# Patient Record
Sex: FEMALE | Race: BLACK OR AFRICAN AMERICAN | NOT HISPANIC OR LATINO | Employment: UNEMPLOYED | URBAN - METROPOLITAN AREA
[De-identification: names, ages, dates, MRNs, and addresses within clinical notes are randomized per-mention and may not be internally consistent; named-entity substitution may affect disease eponyms.]

---

## 2021-06-05 ENCOUNTER — HOSPITAL ENCOUNTER (EMERGENCY)
Facility: HOSPITAL | Age: 36
Discharge: HOME | End: 2021-06-05
Attending: EMERGENCY MEDICINE
Payer: MEDICARE

## 2021-06-05 VITALS
DIASTOLIC BLOOD PRESSURE: 88 MMHG | WEIGHT: 293 LBS | SYSTOLIC BLOOD PRESSURE: 149 MMHG | RESPIRATION RATE: 19 BRPM | HEIGHT: 69 IN | BODY MASS INDEX: 43.4 KG/M2 | OXYGEN SATURATION: 98 % | HEART RATE: 81 BPM | TEMPERATURE: 99 F

## 2021-06-05 DIAGNOSIS — R20.0 NUMBNESS: ICD-10-CM

## 2021-06-05 LAB
ALBUMIN SERPL-MCNC: 3.8 G/DL (ref 3.3–5.5)
ALP SERPL-CCNC: 74 U/L (ref 42–141)
B-HCG UR QL: NEGATIVE
BILIRUB SERPL-MCNC: 0.5 MG/DL (ref 0.2–1.6)
BILIRUBIN, POC UA: NEGATIVE
BLOOD, POC UA: ABNORMAL
BUN SERPL-MCNC: 10 MG/DL (ref 7–22)
CALCIUM SERPL-MCNC: 9.5 MG/DL (ref 8–10.3)
CHLORIDE SERPL-SCNC: 104 MMOL/L (ref 98–108)
CLARITY, POC UA: CLEAR
COLOR, POC UA: YELLOW
CREAT SERPL-MCNC: 0.5 MG/DL (ref 0.6–1.2)
CTP QC/QA: YES
GLUCOSE SERPL-MCNC: 113 MG/DL (ref 73–118)
GLUCOSE, POC UA: NEGATIVE
KETONES, POC UA: NEGATIVE
LEUKOCYTE EST, POC UA: NEGATIVE
NITRITE, POC UA: NEGATIVE
PH UR STRIP: 6.5 [PH]
POC ALT (SGPT): 16 U/L (ref 10–47)
POC AST (SGOT): 20 U/L (ref 11–38)
POC TCO2: 27 MMOL/L (ref 18–33)
POTASSIUM BLD-SCNC: 3.4 MMOL/L (ref 3.6–5.1)
PROTEIN, POC UA: ABNORMAL
PROTEIN, POC: 7.7 G/DL (ref 6.4–8.1)
SODIUM BLD-SCNC: 140 MMOL/L (ref 128–145)
SPECIFIC GRAVITY, POC UA: 1.02
UROBILINOGEN, POC UA: 0.2 E.U./DL

## 2021-06-05 PROCEDURE — 81025 URINE PREGNANCY TEST: CPT | Mod: ER | Performed by: EMERGENCY MEDICINE

## 2021-06-05 PROCEDURE — 81003 URINALYSIS AUTO W/O SCOPE: CPT | Mod: ER

## 2021-06-05 PROCEDURE — 93005 ELECTROCARDIOGRAM TRACING: CPT | Mod: ER

## 2021-06-05 PROCEDURE — 93010 EKG 12-LEAD: ICD-10-PCS | Mod: ,,, | Performed by: INTERNAL MEDICINE

## 2021-06-05 PROCEDURE — 93010 ELECTROCARDIOGRAM REPORT: CPT | Mod: ,,, | Performed by: INTERNAL MEDICINE

## 2021-06-05 PROCEDURE — 25000003 PHARM REV CODE 250: Mod: ER | Performed by: NURSE PRACTITIONER

## 2021-06-05 PROCEDURE — 99283 EMERGENCY DEPT VISIT LOW MDM: CPT | Mod: 25,ER

## 2021-06-05 PROCEDURE — 85025 COMPLETE CBC W/AUTO DIFF WBC: CPT | Mod: ER

## 2021-06-05 PROCEDURE — 80053 COMPREHEN METABOLIC PANEL: CPT | Mod: ER

## 2021-06-05 RX ORDER — POTASSIUM CHLORIDE 20 MEQ/1
40 TABLET, EXTENDED RELEASE ORAL
Status: COMPLETED | OUTPATIENT
Start: 2021-06-05 | End: 2021-06-05

## 2021-06-05 RX ADMIN — POTASSIUM CHLORIDE 40 MEQ: 1500 TABLET, EXTENDED RELEASE ORAL at 12:06

## 2022-01-31 ENCOUNTER — HOSPITAL ENCOUNTER (EMERGENCY)
Facility: HOSPITAL | Age: 37
Discharge: HOME | End: 2022-01-31
Attending: EMERGENCY MEDICINE
Payer: MEDICARE

## 2022-01-31 VITALS
BODY MASS INDEX: 43.4 KG/M2 | TEMPERATURE: 98 F | DIASTOLIC BLOOD PRESSURE: 85 MMHG | SYSTOLIC BLOOD PRESSURE: 142 MMHG | OXYGEN SATURATION: 99 % | HEIGHT: 69 IN | RESPIRATION RATE: 18 BRPM | HEART RATE: 92 BPM | WEIGHT: 293 LBS

## 2022-01-31 DIAGNOSIS — R10.9 FLANK PAIN: ICD-10-CM

## 2022-01-31 DIAGNOSIS — N20.0 LEFT RENAL STONE: ICD-10-CM

## 2022-01-31 DIAGNOSIS — O36.80X0 PREGNANCY OF UNKNOWN ANATOMIC LOCATION: Primary | ICD-10-CM

## 2022-01-31 DIAGNOSIS — K80.20 CALCULUS OF GALLBLADDER WITHOUT CHOLECYSTITIS WITHOUT OBSTRUCTION: ICD-10-CM

## 2022-01-31 DIAGNOSIS — Z33.1 PREGNANCY TEST POSITIVE FOR INCIDENTAL PREGNANCY: ICD-10-CM

## 2022-01-31 DIAGNOSIS — O36.80X0 PREGNANCY WITH INCONCLUSIVE FETAL VIABILITY, SINGLE OR UNSPECIFIED FETUS: ICD-10-CM

## 2022-01-31 LAB
ALBUMIN SERPL BCP-MCNC: 3.9 G/DL (ref 3.5–5.2)
ALP SERPL-CCNC: 66 U/L (ref 55–135)
ALT SERPL W/O P-5'-P-CCNC: 16 U/L (ref 10–44)
ANION GAP SERPL CALC-SCNC: 9 MMOL/L (ref 8–16)
AST SERPL-CCNC: 16 U/L (ref 10–40)
B-HCG UR QL: POSITIVE
BACTERIA #/AREA URNS HPF: ABNORMAL /HPF
BASOPHILS # BLD AUTO: 0.03 K/UL (ref 0–0.2)
BASOPHILS NFR BLD: 0.4 % (ref 0–1.9)
BILIRUB SERPL-MCNC: 0.2 MG/DL (ref 0.1–1)
BILIRUB UR QL STRIP: NEGATIVE
BUN SERPL-MCNC: 10 MG/DL (ref 6–20)
CALCIUM SERPL-MCNC: 8.8 MG/DL (ref 8.7–10.5)
CHLORIDE SERPL-SCNC: 105 MMOL/L (ref 95–110)
CLARITY UR: ABNORMAL
CO2 SERPL-SCNC: 22 MMOL/L (ref 23–29)
COLOR UR: ABNORMAL
CREAT SERPL-MCNC: 0.7 MG/DL (ref 0.5–1.4)
CTP QC/QA: YES
DAT IGG-SP REAG RBC-IMP: NORMAL
DIFFERENTIAL METHOD: ABNORMAL
EOSINOPHIL # BLD AUTO: 0.1 K/UL (ref 0–0.5)
EOSINOPHIL NFR BLD: 1.3 % (ref 0–8)
ERYTHROCYTE [DISTWIDTH] IN BLOOD BY AUTOMATED COUNT: 14.6 % (ref 11.5–14.5)
EST. GFR  (AFRICAN AMERICAN): >60 ML/MIN/1.73 M^2
EST. GFR  (NON AFRICAN AMERICAN): >60 ML/MIN/1.73 M^2
GLUCOSE SERPL-MCNC: 104 MG/DL (ref 70–110)
GLUCOSE UR QL STRIP: NEGATIVE
HCG INTACT+B SERPL-ACNC: 413 MIU/ML
HCT VFR BLD AUTO: 35.8 % (ref 37–48.5)
HGB BLD-MCNC: 11.6 G/DL (ref 12–16)
HGB UR QL STRIP: ABNORMAL
HYALINE CASTS #/AREA URNS LPF: 0 /LPF
IMM GRANULOCYTES # BLD AUTO: 0.03 K/UL (ref 0–0.04)
IMM GRANULOCYTES NFR BLD AUTO: 0.4 % (ref 0–0.5)
KETONES UR QL STRIP: NEGATIVE
LEUKOCYTE ESTERASE UR QL STRIP: ABNORMAL
LYMPHOCYTES # BLD AUTO: 2.1 K/UL (ref 1–4.8)
LYMPHOCYTES NFR BLD: 27.5 % (ref 18–48)
MCH RBC QN AUTO: 28.9 PG (ref 27–31)
MCHC RBC AUTO-ENTMCNC: 32.4 G/DL (ref 32–36)
MCV RBC AUTO: 89 FL (ref 82–98)
MICROSCOPIC COMMENT: ABNORMAL
MONOCYTES # BLD AUTO: 0.7 K/UL (ref 0.3–1)
MONOCYTES NFR BLD: 8.4 % (ref 4–15)
NEUTROPHILS # BLD AUTO: 4.8 K/UL (ref 1.8–7.7)
NEUTROPHILS NFR BLD: 62 % (ref 38–73)
NITRITE UR QL STRIP: NEGATIVE
NRBC BLD-RTO: 0 /100 WBC
PH UR STRIP: 7 [PH] (ref 5–8)
PLATELET # BLD AUTO: 309 K/UL (ref 150–450)
PMV BLD AUTO: 8.8 FL (ref 9.2–12.9)
POTASSIUM SERPL-SCNC: 3.8 MMOL/L (ref 3.5–5.1)
PROT SERPL-MCNC: 8.3 G/DL (ref 6–8.4)
PROT UR QL STRIP: ABNORMAL
RBC # BLD AUTO: 4.02 M/UL (ref 4–5.4)
RBC #/AREA URNS HPF: >100 /HPF (ref 0–4)
SODIUM SERPL-SCNC: 136 MMOL/L (ref 136–145)
SP GR UR STRIP: 1.02 (ref 1–1.03)
SQUAMOUS #/AREA URNS HPF: 1 /HPF
UNIDENT CRYS URNS QL MICRO: ABNORMAL
URN SPEC COLLECT METH UR: ABNORMAL
UROBILINOGEN UR STRIP-ACNC: NEGATIVE EU/DL
WBC # BLD AUTO: 7.75 K/UL (ref 3.9–12.7)
WBC #/AREA URNS HPF: 1 /HPF (ref 0–5)

## 2022-01-31 PROCEDURE — 80053 COMPREHEN METABOLIC PANEL: CPT | Performed by: NURSE PRACTITIONER

## 2022-01-31 PROCEDURE — 81000 URINALYSIS NONAUTO W/SCOPE: CPT | Performed by: EMERGENCY MEDICINE

## 2022-01-31 PROCEDURE — 86880 COOMBS TEST DIRECT: CPT | Performed by: EMERGENCY MEDICINE

## 2022-01-31 PROCEDURE — 85025 COMPLETE CBC W/AUTO DIFF WBC: CPT | Performed by: NURSE PRACTITIONER

## 2022-01-31 PROCEDURE — 25000003 PHARM REV CODE 250: Performed by: NURSE PRACTITIONER

## 2022-01-31 PROCEDURE — 81003 URINALYSIS AUTO W/O SCOPE: CPT | Performed by: EMERGENCY MEDICINE

## 2022-01-31 PROCEDURE — 96360 HYDRATION IV INFUSION INIT: CPT

## 2022-01-31 PROCEDURE — 86901 BLOOD TYPING SEROLOGIC RH(D): CPT | Performed by: EMERGENCY MEDICINE

## 2022-01-31 PROCEDURE — 81025 URINE PREGNANCY TEST: CPT | Performed by: EMERGENCY MEDICINE

## 2022-01-31 PROCEDURE — 84702 CHORIONIC GONADOTROPIN TEST: CPT | Performed by: NURSE PRACTITIONER

## 2022-01-31 PROCEDURE — 99284 EMERGENCY DEPT VISIT MOD MDM: CPT | Mod: 25

## 2022-01-31 RX ORDER — KETOROLAC TROMETHAMINE 30 MG/ML
10 INJECTION, SOLUTION INTRAMUSCULAR; INTRAVENOUS
Status: DISCONTINUED | OUTPATIENT
Start: 2022-01-31 | End: 2022-01-31

## 2022-01-31 RX ORDER — ACETAMINOPHEN 325 MG/1
650 TABLET ORAL
Status: COMPLETED | OUTPATIENT
Start: 2022-01-31 | End: 2022-01-31

## 2022-01-31 RX ADMIN — ACETAMINOPHEN 650 MG: 325 TABLET ORAL at 04:01

## 2022-01-31 NOTE — ED PROVIDER NOTES
Encounter Date: 2022    SCRIBE #1 NOTE: I, Erick Tolentino, am scribing for, and in the presence of,  ESPERANZA Delatorre. I have scribed the following portions of the note - Other sections scribed: HPI, ROS.       History     Chief Complaint   Patient presents with    Flank Pain     PT to ER with c/o right sided flank pain since yesterday with hematuria.      36 y.o. female, with a pertinent past medical history of hypertension presents to the ED with bilateral flank pain that began last night. Pt reports associated abdominal pain that began last night. Pt states that the abdominal pain and left sided flank pain resolved. Currently, pt reports only experiencing the right sided flank pain and hematuria. Pt denies having a history of kidney stones. No other exacerbating or alleviating factors. Patient denies rash, dysuria, fever, or other associated symptoms.     Incidental finding of positive UPT after my initial HPI.  Patient's last menstrual cycle was 2021. Prior to this +UPT she is  with a set of twins.  She has no vaginal bleeding or vaginal discharge, no pelvic pain.    The history is provided by the patient. No  was used.     Review of patient's allergies indicates:  No Known Allergies  Past Medical History:   Diagnosis Date    Hypertension      No past surgical history on file.  No family history on file.  Social History     Tobacco Use    Smoking status: Never Smoker     Review of Systems   Constitutional: Negative for chills and fever.   HENT: Negative for congestion, rhinorrhea and sore throat.    Eyes: Negative for visual disturbance.   Respiratory: Negative for cough and shortness of breath.    Cardiovascular: Negative for chest pain.   Gastrointestinal: Positive for abdominal pain. Negative for diarrhea, nausea and vomiting.   Genitourinary: Positive for flank pain and hematuria. Negative for decreased urine volume, dysuria, frequency, pelvic pain, urgency,  vaginal bleeding, vaginal discharge and vaginal pain.   Musculoskeletal: Negative for back pain, neck pain and neck stiffness.   Skin: Negative for rash and wound.   Neurological: Negative for dizziness, weakness and headaches.   Psychiatric/Behavioral: Negative for confusion.       Physical Exam     Initial Vitals [01/31/22 1434]   BP Pulse Resp Temp SpO2   (!) 134/98 95 18 98.4 °F (36.9 °C) 99 %      MAP       --         Physical Exam    Nursing note and vitals reviewed.  Constitutional: She appears well-developed and well-nourished. She is not diaphoretic. She is Obese . She is cooperative.  Non-toxic appearance. She does not have a sickly appearance. She does not appear ill. No distress.   HENT:   Head: Normocephalic and atraumatic.   Right Ear: External ear normal.   Left Ear: External ear normal.   Nose: Nose normal.   Mouth/Throat: Mucous membranes are normal. No trismus in the jaw.   Eyes: Conjunctivae and EOM are normal.   Neck: Phonation normal. No tracheal deviation present.   Normal range of motion.  Cardiovascular: Normal rate, regular rhythm and intact distal pulses.   Pulses:       Radial pulses are 2+ on the right side and 2+ on the left side.   Pulmonary/Chest: Effort normal and breath sounds normal. No accessory muscle usage or stridor. No tachypnea and no bradypnea. No respiratory distress. She has no wheezes. She has no rhonchi. She has no rales. She exhibits no tenderness.   Abdominal: She exhibits no distension. There is no abdominal tenderness.   Reported resolved left flank pain.  Reports some continued (5/10) right flank pain.  No CVA tenderness.  No bruising or rashes.   No right CVA tenderness.  No left CVA tenderness. There is no rebound and no guarding.   Musculoskeletal:         General: Normal range of motion.      Cervical back: Normal range of motion.     Neurological: She is alert and oriented to person, place, and time. She has normal strength. No sensory deficit. Coordination and  gait normal. GCS score is 15. GCS eye subscore is 4. GCS verbal subscore is 5. GCS motor subscore is 6.   Skin: Skin is warm, dry and intact. Capillary refill takes less than 2 seconds. No bruising and no rash noted. No cyanosis or erythema. Nails show no clubbing.   Psychiatric: She has a normal mood and affect. Her behavior is normal. Judgment and thought content normal.         ED Course   Procedures  Labs Reviewed   URINALYSIS, REFLEX TO URINE CULTURE - Abnormal; Notable for the following components:       Result Value    Color, UA Orange (*)     Appearance, UA Hazy (*)     Protein, UA 1+ (*)     Occult Blood UA 3+ (*)     Leukocytes, UA Trace (*)     All other components within normal limits    Narrative:     Specimen Source->Urine   CBC W/ AUTO DIFFERENTIAL - Abnormal; Notable for the following components:    Hemoglobin 11.6 (*)     Hematocrit 35.8 (*)     RDW 14.6 (*)     MPV 8.8 (*)     All other components within normal limits   COMPREHENSIVE METABOLIC PANEL - Abnormal; Notable for the following components:    CO2 22 (*)     All other components within normal limits   URINALYSIS MICROSCOPIC - Abnormal; Notable for the following components:    RBC, UA >100 (*)     All other components within normal limits    Narrative:     Specimen Source->Urine   POCT URINE PREGNANCY - Abnormal; Notable for the following components:    POC Preg Test, Ur Positive (*)     All other components within normal limits   CULTURE, URINE   HCG, QUANTITATIVE    Narrative:     Release to patient->Immediate   GROUP & RH          Imaging Results          US OB <14 Wks TransAbd & TransVag, Single Gestation (XPD) (Final result)  Result time 22 18:50:51    Final result by Joshua Reyes MD (22 18:50:51)                 Impression:      No intrauterine pregnancy or gestational sac visualized, technically pregnancy of unknown location.  Findings may relate to early pregnancy or spontaneous .  No adnexal abnormalities or  significant free fluid seen to suggest ectopic pregnancy.  Recommend serial beta hCGs and repeat pelvic ultrasound as clinically warranted.      Electronically signed by: Joshua Reyes MD  Date:    01/31/2022  Time:    18:50             Narrative:    EXAMINATION:  US OB <14 WEEKS, TRANSABDOM & TRANSVAG, SINGLE GESTATION (XPD)    CLINICAL HISTORY:  +UPT with Flank/Abdominal Pain;    TECHNIQUE:  Transabdominal sonography of the pelvis was performed, followed by transvaginal sonography to better evaluate the uterus and ovaries.    COMPARISON:  None.    FINDINGS:  The uterus measures 10.4 x 6.4 x 6.8 cm. Uterine parenchyma is heterogenous in echotexture with small 2.0 x 1.9 x 1.8 cm fibroid seen.  No intrauterine pregnancy or gestational sac seen.  There is mild heterogenous thickening of the endometrium.    The right ovary measures 3.4 x 1.9 x 1.9 cm. The left ovary measures 2.9 x 3.0 x 2.3 cm. Arterial and venous flow are preserved bilaterally. A suspected corpus luteum cyst measuring 1.4 x 1.4 x 1.3 cm is seen in the left ovary.  No adnexal abnormalities are seen.  No significant free fluid is seen.                               US Abdomen Limited (Final result)  Result time 01/31/22 18:52:14    Final result by Joshua Reyes MD (01/31/22 18:52:14)                 Impression:      Cholelithiasis.  No ultrasonographic evidence to suggest acute cholecystitis.      Electronically signed by: Joshua Reyes MD  Date:    01/31/2022  Time:    18:52             Narrative:    EXAMINATION:  US ABDOMEN LIMITED    CLINICAL HISTORY:  Gallstones;    TECHNIQUE:  Limited ultrasound of the right upper quadrant of the abdomen was performed.    COMPARISON:  None.    FINDINGS:  Hepatic parenchyma is homogeneous without evidence for masses.  No intra- or extrahepatic biliary ductal dilatation. The common bile duct measures 0.4 cm.  The gallbladder demonstrates small mobile stones.  No evidence of gallbladder wall thickening or  pericholecystic fluid.  Sonographic Eldridge's sign is negative. The visualized portion of the pancreas appears normal. No ascites.                               US Retroperitoneal Complete (Final result)  Result time 01/31/22 18:54:28    Final result by Joshua Reyes MD (01/31/22 18:54:28)                 Impression:      No hydronephrosis.  Possible nonobstructing left renal stone.      Electronically signed by: Joshua Reyes MD  Date:    01/31/2022  Time:    18:54             Narrative:    EXAMINATION:  US RETROPERITONEAL COMPLETE    CLINICAL HISTORY:  Flank pain with hemeturia - r/o renal stone;    TECHNIQUE:  Ultrasound of the kidneys and urinary bladder was performed including color flow and Doppler evaluation of the kidneys.    COMPARISON:  None.    FINDINGS:  The kidneys measure 13.7 cm on the right and 12.1 cm on the left. There is preserved corticomedullary differentiation and normal cortical thickness.  No hydronephrosis.  There is a 1.0 cm echogenic focus within the left kidney with shadowing.  No solid renal mass seen.  Perfusion to the kidneys is normal. Resistive index measures 0.65 on the right and is unable to be obtained on the left.  The urinary bladder appears normal.                                 Medications   sodium chloride 0.9% bolus 1,000 mL (0 mLs Intravenous Stopped 1/31/22 1754)   acetaminophen tablet 650 mg (650 mg Oral Given 1/31/22 1654)     Medical Decision Making:   History:   Old Medical Records: I decided to obtain old medical records.       APC / Resident Notes:   This is an evaluation of a 36 y.o. female that presents to the Emergency Department for resolved left flank pain, continued right flank pain, hematuria.  Incidentally found to have a positive pregnancy test in the ED. That LMP would put her at 6 weeks 3 days.  Physical Exam shows a non-toxic, afebrile, and well appearing female.  Her abdomen is soft with no tenderness throughout.  There is no tenderness over the  bilateral CVAs.  There is no muscular tenderness palpation over the left right flank.  There are no rashes, reason, erythema present. Vital signs are reassuring. If available, previous records reviewed. RESULTS:  UPT positive.  CBC without leukocytosis.  H&H 11.6 and 35.8.  Normal platelet count.  No previous labs to compare.  CMP with a CO2 22, otherwise unremarkable.  Urinalysis with trace leukocytes however 1 WBC and occasional bacteria. >100 RBC's. Suspect contamination but will culture as she is pregnant. Incidentally, there are unclassified crystals in the urine.      Ultrasound transabdominal and transvaginal:  No intrauterine pregnancy visualized.  Pregnancy of unknown anatomic location.  Findings may relate to early pregnancy or miscarriage.  No adnexal abnormalities noted to suggest ectopic.  Ultrasound of the abdomen with cholelithiasis without cholecystitis.  Suspected 1 cm nonobstructing left kidney stone. ABO/Rh on previous labs AB+.     My overall impression is flank pain, pregnancies an incidental finding, cholelithiasis without cholecystitis, and nonobstructing left renal stone.  Ultrasound does not identify definite IUP, this could represent early pregnancy versus miscarriage.  Patient's beta hCG is 413. There are no suspicious adnexal masses to suggest ectopic at this time.  I have stressed the importance of following up with OBGYN return to the ED for recheck in 48 hours. I considered, but at this time, do not suspect bowel obstruction, bowel perforation, appendicitis, diverticulitis, cholecystitis, pancreatitis, pyelonephritis.    Discharge Meds/Instructions:  OBGYN follow-up, general surgery follow-up. The diagnosis, treatment plan, instructions for follow-up as well as ED return precautions were discussed. All questions have been answered. This case was discussed with Dr. Bobo who is in agreement with my assessment and plan. ANA LUISA Licea, FNP-C       Scribe Attestation:   Scribe #1:  I performed the above scribed service and the documentation accurately describes the services I performed. I attest to the accuracy of the note.        ED Course as of 01/31/22 2050 Mon Jan 31, 2022   1631 Incidental finding of positive pregnancy test was discussed with the patient.  Will cancel the CT abdomen pelvis and add ultrasound with ABO Rh on HCG level. [AF]   1800 Ultrasound tech called stated she noted gallstones in the gallbladder.  Requested ultrasound of the abdomen limited.  Order has been placed. [AF]      ED Course User Index  [AF] ESPERANZA Delatorre             Clinical Impression:   Final diagnoses:  [R10.9] Flank pain  [Z33.1] Pregnancy test positive for incidental pregnancy  [N20.0] Left renal stone  [K80.20] Calculus of gallbladder without cholecystitis without obstruction  [O36.80X0] Pregnancy of unknown anatomic location (Primary)  [O36.80X0] Pregnancy with inconclusive fetal viability, single or unspecified fetus          ED Disposition Condition    Discharge Stable        ED Prescriptions     None        Follow-up Information     Follow up With Specialties Details Why Contact Info    Your OB/GYN  Schedule an appointment as soon as possible for a visit  Call to Schedule an Appointment For Follow-Up     Dario Burkett MD General Surgery, Surgery Call in 1 day To discuss your ED visit & schedule follow-up (Gallbladder) [address]  [address]  [facility] [address]  [phone]      Larisa Quinonez MD Urology Call  To discuss your ED visit & schedule follow-up (Kidney Stone) [address]  [address]  [place] [address]  [phone]      [facility] Emergency [facility] Emergency Medicine Go to  If symptoms worsen [address]  [facility] [id number] [phone]       IBESSIE, ANA LUISA, FNP-C, personally performed the services described in this documentation. All medical record entries made by the scribe were at my direction and in my presence. I have reviewed the chart  and agree that the record reflects my personal performance and is accurate and complete.       Marko Zelaya, [facility]  01/31/22 2051

## 2022-01-31 NOTE — DISCHARGE INSTRUCTIONS
Please call your OBGYN in the morning to discuss your positive pregnancy test.  Your pregnancy hormone (hCG) was 413 today. You will need a recheck in 48 hours. If not able to contact your OB please return to the ER.     § Please return to the Emergency Department for any new or worsening symptoms including: fever, chest pain, shortness of breath, loss of consciousness, dizziness, weakness, vaginal bleeding, abdominal pain or cramping, or any other concerns.     § Schedule an appointment for follow up with OB/GYN for pregnancy, Urology for left kidney stone, and General Surgery for gallstones, as soon as possible for a recheck of your symptoms. If you do not have one, contact the one listed on your discharge paperwork or call the [facility] Appointment Desk at [phone] to schedule an appointment.     § Tylenol as needed for pain.  Please start taking a prenatal vitamin.

## 2022-02-01 ENCOUNTER — PES CALL (OUTPATIENT)
Dept: ADMINISTRATIVE | Facility: CLINIC | Age: 37
End: 2022-02-01
Payer: MEDICARE

## 2022-02-01 LAB
ABO GROUP BLD: NORMAL
RH BLD: NORMAL

## 2022-02-18 ENCOUNTER — HOSPITAL ENCOUNTER (EMERGENCY)
Facility: HOSPITAL | Age: 37
Discharge: HOME | End: 2022-02-18
Attending: EMERGENCY MEDICINE
Payer: MEDICARE

## 2022-02-18 VITALS
SYSTOLIC BLOOD PRESSURE: 111 MMHG | HEIGHT: 69 IN | RESPIRATION RATE: 18 BRPM | HEART RATE: 102 BPM | OXYGEN SATURATION: 100 % | BODY MASS INDEX: 43.4 KG/M2 | TEMPERATURE: 99 F | DIASTOLIC BLOOD PRESSURE: 72 MMHG | WEIGHT: 293 LBS

## 2022-02-18 DIAGNOSIS — Z11.3 SCREEN FOR STD (SEXUALLY TRANSMITTED DISEASE): Primary | ICD-10-CM

## 2022-02-18 DIAGNOSIS — A53.9 SYPHILIS: ICD-10-CM

## 2022-02-18 LAB
B-HCG UR QL: POSITIVE
CTP QC/QA: YES

## 2022-02-18 PROCEDURE — 86593 SYPHILIS TEST NON-TREP QUANT: CPT | Mod: 91 | Performed by: NURSE PRACTITIONER

## 2022-02-18 PROCEDURE — 86780 TREPONEMA PALLIDUM: CPT | Performed by: NURSE PRACTITIONER

## 2022-02-18 PROCEDURE — 36415 COLL VENOUS BLD VENIPUNCTURE: CPT | Performed by: NURSE PRACTITIONER

## 2022-02-18 PROCEDURE — 96372 THER/PROPH/DIAG INJ SC/IM: CPT | Mod: ER

## 2022-02-18 PROCEDURE — 86593 SYPHILIS TEST NON-TREP QUANT: CPT | Performed by: NURSE PRACTITIONER

## 2022-02-18 PROCEDURE — 99284 EMERGENCY DEPT VISIT MOD MDM: CPT | Mod: 25,ER

## 2022-02-18 PROCEDURE — 86592 SYPHILIS TEST NON-TREP QUAL: CPT | Performed by: NURSE PRACTITIONER

## 2022-02-18 PROCEDURE — 81025 URINE PREGNANCY TEST: CPT | Mod: ER | Performed by: NURSE PRACTITIONER

## 2022-02-18 PROCEDURE — 86780 TREPONEMA PALLIDUM: CPT | Mod: 91 | Performed by: NURSE PRACTITIONER

## 2022-02-18 PROCEDURE — 63600175 PHARM REV CODE 636 W HCPCS: Mod: JG,ER | Performed by: NURSE PRACTITIONER

## 2022-02-18 RX ORDER — LABETALOL 200 MG/1
200 TABLET, FILM COATED ORAL 2 TIMES DAILY
COMMUNITY
End: 2023-12-19

## 2022-02-18 RX ADMIN — PENICILLIN G BENZATHINE 2.4 MILLION UNITS: 1200000 INJECTION, SUSPENSION INTRAMUSCULAR at 06:02

## 2022-02-19 NOTE — ED PROVIDER NOTES
Encounter Date: 2/18/2022    SCRIBE #1 NOTE: I, Khai Reyes, am scribing for, and in the presence of,  Esther Waters NP. I have scribed the following portions of the note - Other sections scribed: HPI, ROS.       History     Chief Complaint   Patient presents with    STD CHECK     Pt reports she is 6 weeks pregnant and her obgyn called to let her know that her blood work showed that she was positive for syphilis. Pt reports the dr did send a prescription to [facility] but she has not picked it up. Denies symptoms at this time, denies pain or discharge at this time.      Patient is a 36 year old female with PMHx of HTN who presents to ED with complaints of a STD check onset today. She reports that her Ob called her to report that she was positive for syphilis. Not currently displaying any symptoms. Patient is currently 6 weeks pregnant. She notes her boyfriend was treated for syphilis here recently. No recent antibiotic use. She has another appointment with PCP Monday to monitor her pressure. Denies any rash. No other complaints at this time.     The history is provided by the patient and a significant other. No  was used.     Review of patient's allergies indicates:  No Known Allergies  Past Medical History:   Diagnosis Date    Hypertension      No past surgical history on file.  No family history on file.  Social History     Tobacco Use    Smoking status: Never Smoker     Review of Systems   Constitutional: Negative for activity change, appetite change, chills, fatigue and fever.   HENT: Negative for congestion, sore throat, trouble swallowing and voice change.    Eyes: Negative for photophobia, pain, redness and visual disturbance.   Respiratory: Negative for cough, chest tightness, shortness of breath and wheezing.    Cardiovascular: Negative for chest pain, palpitations and leg swelling.   Gastrointestinal: Negative for abdominal distention, abdominal pain, anal bleeding, constipation,  diarrhea, nausea and vomiting.   Endocrine: Negative for polydipsia, polyphagia and polyuria.   Genitourinary: Negative for difficulty urinating, dysuria, frequency, hematuria, urgency, vaginal bleeding and vaginal discharge.   Musculoskeletal: Negative for arthralgias and myalgias.   Skin: Negative for rash and wound.   Neurological: Negative for dizziness, weakness, light-headedness and headaches.   Hematological: Negative for adenopathy.   All other systems reviewed and are negative.      Physical Exam     Initial Vitals [02/18/22 1739]   BP Pulse Resp Temp SpO2   114/70 110 16 98.6 °F (37 °C) 100 %      MAP       --         Physical Exam    Constitutional: She appears well-developed and well-nourished. She is not diaphoretic. No distress.   HENT:   Head: Normocephalic and atraumatic.   Neck:   Normal range of motion.  Cardiovascular: Normal rate, regular rhythm, normal heart sounds and intact distal pulses.   Pulmonary/Chest: Breath sounds normal. No respiratory distress.   Abdominal: Abdomen is soft. Bowel sounds are normal. There is no abdominal tenderness.   Musculoskeletal:         General: Normal range of motion.      Cervical back: Normal range of motion.     Neurological: She is alert and oriented to person, place, and time.   Skin: Skin is warm and dry.   Psychiatric: She has a normal mood and affect. Her behavior is normal.         ED Course   Procedures  Labs Reviewed   POCT URINE PREGNANCY - Abnormal; Notable for the following components:       Result Value    POC Preg Test, Ur Positive (*)     All other components within normal limits   RPR   TREPONEMA PALLIDUM (SYPHILIS) ANTIBODY          Imaging Results    None          Medications   penicillin G benzathine (BICILLIN LA) injection 2.4 Million Units (2.4 Million Units Intramuscular Given 2/18/22 1828)     Medical Decision Making:   ED Management:  36 year old pregnant female presenting to ED for treatment after her OBGYN called her and told her she  tested positive for syphilis.  She has no symptoms.  Denies any rash or skin lesions.  She is well-appearing.  Vital signs are stable.  Treated with Bicillin.  She has an appointment Monday with her OBGYN.  She will keep this for follow-up.          Scribe Attestation:   Scribe #1: I performed the above scribed service and the documentation accurately describes the services I performed. I attest to the accuracy of the note.                   I, AMADO Waters, personally performed the services described in this documentation. All medical record entries made by the scribe were at my direction and in my presence. I have reviewed the chart and agree that the record reflects my personal performance and is accurate and complete.  Clinical Impression:   Final diagnoses:  [Z11.3] Screen for STD (sexually transmitted disease) (Primary)  [A53.9] Syphilis          ED Disposition Condition    Discharge Stable        ED Prescriptions     None        Follow-up Information     Follow up With Specialties Details Why Contact Info    Haley Henson MD Obstetrics and Gynecology Schedule an appointment as soon as possible for a visit on 2/21/2022 For follow-up [address] [facility] [id number] [phone]      [facility] ED Emergency Medicine Go to  If symptoms worsen [address] [facility] [address] [phone]           Esther Waters NP  02/18/22 1931

## 2022-02-19 NOTE — DISCHARGE INSTRUCTIONS
Please keep your appointment Monday with your OBGYN for further evaluation and treatment.  Return to the emergency department for any new or worsening symptoms.  Use a condom.

## 2022-02-21 LAB
RPR SER QL: REACTIVE
RPR SER-TITR: ABNORMAL {TITER}

## 2022-02-22 LAB
T PALLIDUM AB SER QL IF: REACTIVE
TREPONEMA PALLIDUM IGG+IGM AB [PRESENCE] IN SERUM OR PLASMA BY IMMUNOASSAY: REACTIVE

## 2022-02-23 LAB — RPR SER-TITR: ABNORMAL {TITER}

## 2022-06-26 ENCOUNTER — HOSPITAL ENCOUNTER (EMERGENCY)
Facility: HOSPITAL | Age: 37
Discharge: LEFT BEFORE DISCHARGE | End: 2022-06-26
Attending: EMERGENCY MEDICINE
Payer: MEDICARE

## 2022-06-26 VITALS
WEIGHT: 293 LBS | TEMPERATURE: 98 F | SYSTOLIC BLOOD PRESSURE: 145 MMHG | BODY MASS INDEX: 44.41 KG/M2 | OXYGEN SATURATION: 98 % | RESPIRATION RATE: 18 BRPM | DIASTOLIC BLOOD PRESSURE: 91 MMHG | HEIGHT: 68 IN | HEART RATE: 102 BPM

## 2022-06-26 LAB
BILIRUBIN, POC UA: ABNORMAL
BLOOD, POC UA: ABNORMAL
CLARITY, POC UA: ABNORMAL
COLOR, POC UA: ABNORMAL
GLUCOSE, POC UA: NEGATIVE
KETONES, POC UA: ABNORMAL
LEUKOCYTE EST, POC UA: NEGATIVE
NITRITE, POC UA: NEGATIVE
PH UR STRIP: 6.5 [PH]
PROTEIN, POC UA: ABNORMAL
SPECIFIC GRAVITY, POC UA: 1.02
UROBILINOGEN, POC UA: 0.2 E.U./DL

## 2022-06-26 PROCEDURE — 81003 URINALYSIS AUTO W/O SCOPE: CPT | Mod: ER

## 2022-06-26 PROCEDURE — 99283 EMERGENCY DEPT VISIT LOW MDM: CPT | Mod: ER

## 2022-10-25 ENCOUNTER — HOSPITAL ENCOUNTER (INPATIENT)
Facility: HOSPITAL | Age: 37
LOS: 3 days | Discharge: HOME | DRG: 872 | End: 2022-10-28
Attending: EMERGENCY MEDICINE | Admitting: STUDENT IN AN ORGANIZED HEALTH CARE EDUCATION/TRAINING PROGRAM
Payer: MEDICARE

## 2022-10-25 DIAGNOSIS — N12 PYELONEPHRITIS: Primary | ICD-10-CM

## 2022-10-25 DIAGNOSIS — R00.0 TACHYCARDIA: ICD-10-CM

## 2022-10-25 DIAGNOSIS — R07.9 CHEST PAIN: ICD-10-CM

## 2022-10-25 PROBLEM — I10 PRIMARY HYPERTENSION: Status: ACTIVE | Noted: 2022-10-25

## 2022-10-25 PROBLEM — N20.0 RENAL CALCULUS: Status: ACTIVE | Noted: 2022-10-25

## 2022-10-25 PROBLEM — A41.9 SEPSIS: Status: ACTIVE | Noted: 2022-10-25

## 2022-10-25 LAB
ALBUMIN SERPL-MCNC: 3.6 G/DL (ref 3.3–5.5)
ALLENS TEST: ABNORMAL
ALLENS TEST: ABNORMAL
ALP SERPL-CCNC: 90 U/L (ref 42–141)
B-HCG UR QL: NEGATIVE
BILIRUB SERPL-MCNC: 1.1 MG/DL (ref 0.2–1.6)
BILIRUBIN, POC UA: ABNORMAL
BLOOD, POC UA: ABNORMAL
BUN SERPL-MCNC: 10 MG/DL (ref 7–22)
CALCIUM SERPL-MCNC: 10.1 MG/DL (ref 8–10.3)
CHLORIDE SERPL-SCNC: 100 MMOL/L (ref 98–108)
CLARITY, POC UA: CLEAR
COLOR, POC UA: YELLOW
CREAT SERPL-MCNC: 0.8 MG/DL (ref 0.6–1.2)
CTP QC/QA: YES
CTP QC/QA: YES
GLUCOSE SERPL-MCNC: 131 MG/DL (ref 73–118)
GLUCOSE, POC UA: NEGATIVE
HCO3 UR-SCNC: 23.3 MMOL/L (ref 24–28)
HCO3 UR-SCNC: 25.2 MMOL/L (ref 24–28)
INFLUENZA A ANTIGEN, POC: NEGATIVE
INFLUENZA B ANTIGEN, POC: NEGATIVE
KETONES, POC UA: ABNORMAL
LDH SERPL L TO P-CCNC: 0.74 MMOL/L (ref 0.5–2.2)
LDH SERPL L TO P-CCNC: 2.34 MMOL/L (ref 0.5–2.2)
LEUKOCYTE EST, POC UA: ABNORMAL
NITRITE, POC UA: POSITIVE
PCO2 BLDA: 35.9 MMHG (ref 35–45)
PCO2 BLDA: 45.2 MMHG (ref 35–45)
PH SMN: 7.35 [PH] (ref 7.35–7.45)
PH SMN: 7.42 [PH] (ref 7.35–7.45)
PH UR STRIP: 7 [PH]
PO2 BLDA: 163 MMHG (ref 40–60)
PO2 BLDA: 50 MMHG (ref 40–60)
POC ALT (SGPT): 21 U/L (ref 10–47)
POC AST (SGOT): 19 U/L (ref 11–38)
POC BE: -1 MMOL/L
POC BE: -1 MMOL/L
POC RAPID STREP A: NEGATIVE
POC SATURATED O2: 86 % (ref 95–100)
POC SATURATED O2: 99 % (ref 95–100)
POC TCO2: 24 MMOL/L (ref 18–33)
POC TCO2: 24 MMOL/L (ref 24–29)
POC TCO2: 27 MMOL/L (ref 24–29)
POTASSIUM BLD-SCNC: 3.4 MMOL/L (ref 3.6–5.1)
PROTEIN, POC UA: ABNORMAL
PROTEIN, POC: 8.4 G/DL (ref 6.4–8.1)
SAMPLE: ABNORMAL
SAMPLE: ABNORMAL
SARS-COV-2 RDRP RESP QL NAA+PROBE: NEGATIVE
SITE: ABNORMAL
SITE: ABNORMAL
SODIUM BLD-SCNC: 138 MMOL/L (ref 128–145)
SPECIFIC GRAVITY, POC UA: 1.02
UROBILINOGEN, POC UA: 4 E.U./DL

## 2022-10-25 PROCEDURE — 82803 BLOOD GASES ANY COMBINATION: CPT | Mod: ER

## 2022-10-25 PROCEDURE — 87088 URINE BACTERIA CULTURE: CPT | Performed by: NURSE PRACTITIONER

## 2022-10-25 PROCEDURE — 11000001 HC ACUTE MED/SURG PRIVATE ROOM

## 2022-10-25 PROCEDURE — 25000003 PHARM REV CODE 250: Mod: ER | Performed by: EMERGENCY MEDICINE

## 2022-10-25 PROCEDURE — 96375 TX/PRO/DX INJ NEW DRUG ADDON: CPT | Mod: ER

## 2022-10-25 PROCEDURE — 93010 EKG 12-LEAD: ICD-10-PCS | Mod: ,,, | Performed by: INTERNAL MEDICINE

## 2022-10-25 PROCEDURE — 85025 COMPLETE CBC W/AUTO DIFF WBC: CPT | Mod: ER

## 2022-10-25 PROCEDURE — 81025 URINE PREGNANCY TEST: CPT | Mod: ER | Performed by: EMERGENCY MEDICINE

## 2022-10-25 PROCEDURE — 63600175 PHARM REV CODE 636 W HCPCS: Mod: ER | Performed by: EMERGENCY MEDICINE

## 2022-10-25 PROCEDURE — 87635 SARS-COV-2 COVID-19 AMP PRB: CPT | Mod: ER | Performed by: NURSE PRACTITIONER

## 2022-10-25 PROCEDURE — 87077 CULTURE AEROBIC IDENTIFY: CPT | Mod: 59 | Performed by: NURSE PRACTITIONER

## 2022-10-25 PROCEDURE — 80053 COMPREHEN METABOLIC PANEL: CPT | Mod: ER

## 2022-10-25 PROCEDURE — 25000003 PHARM REV CODE 250: Mod: ER | Performed by: NURSE PRACTITIONER

## 2022-10-25 PROCEDURE — 93010 ELECTROCARDIOGRAM REPORT: CPT | Mod: ,,, | Performed by: INTERNAL MEDICINE

## 2022-10-25 PROCEDURE — 99285 EMERGENCY DEPT VISIT HI MDM: CPT | Mod: 25,ER

## 2022-10-25 PROCEDURE — 25000003 PHARM REV CODE 250: Performed by: STUDENT IN AN ORGANIZED HEALTH CARE EDUCATION/TRAINING PROGRAM

## 2022-10-25 PROCEDURE — 81003 URINALYSIS AUTO W/O SCOPE: CPT | Mod: ER

## 2022-10-25 PROCEDURE — 87086 URINE CULTURE/COLONY COUNT: CPT | Performed by: NURSE PRACTITIONER

## 2022-10-25 PROCEDURE — 87040 BLOOD CULTURE FOR BACTERIA: CPT | Performed by: NURSE PRACTITIONER

## 2022-10-25 PROCEDURE — 87186 SC STD MICRODIL/AGAR DIL: CPT | Performed by: NURSE PRACTITIONER

## 2022-10-25 PROCEDURE — 96365 THER/PROPH/DIAG IV INF INIT: CPT | Mod: ER

## 2022-10-25 PROCEDURE — 93005 ELECTROCARDIOGRAM TRACING: CPT | Mod: ER

## 2022-10-25 PROCEDURE — 87804 INFLUENZA ASSAY W/OPTIC: CPT | Mod: 59,ER

## 2022-10-25 PROCEDURE — 25500020 PHARM REV CODE 255: Mod: ER | Performed by: EMERGENCY MEDICINE

## 2022-10-25 RX ORDER — KETOROLAC TROMETHAMINE 30 MG/ML
15 INJECTION, SOLUTION INTRAMUSCULAR; INTRAVENOUS
Status: COMPLETED | OUTPATIENT
Start: 2022-10-25 | End: 2022-10-25

## 2022-10-25 RX ORDER — SODIUM,POTASSIUM PHOSPHATES 280-250MG
2 POWDER IN PACKET (EA) ORAL
Status: DISCONTINUED | OUTPATIENT
Start: 2022-10-25 | End: 2022-10-28 | Stop reason: HOSPADM

## 2022-10-25 RX ORDER — ACETAMINOPHEN 325 MG/1
650 TABLET ORAL EVERY 8 HOURS PRN
Status: DISCONTINUED | OUTPATIENT
Start: 2022-10-25 | End: 2022-10-26

## 2022-10-25 RX ORDER — LANOLIN ALCOHOL/MO/W.PET/CERES
800 CREAM (GRAM) TOPICAL
Status: DISCONTINUED | OUTPATIENT
Start: 2022-10-25 | End: 2022-10-26

## 2022-10-25 RX ORDER — ONDANSETRON 2 MG/ML
4 INJECTION INTRAMUSCULAR; INTRAVENOUS EVERY 8 HOURS PRN
Status: DISCONTINUED | OUTPATIENT
Start: 2022-10-25 | End: 2022-10-28 | Stop reason: HOSPADM

## 2022-10-25 RX ORDER — ACETAMINOPHEN 500 MG
1000 TABLET ORAL
Status: COMPLETED | OUTPATIENT
Start: 2022-10-25 | End: 2022-10-25

## 2022-10-25 RX ORDER — LABETALOL 100 MG/1
200 TABLET, FILM COATED ORAL 2 TIMES DAILY
Status: DISCONTINUED | OUTPATIENT
Start: 2022-10-25 | End: 2022-10-28 | Stop reason: HOSPADM

## 2022-10-25 RX ORDER — GLUCAGON 1 MG
1 KIT INJECTION
Status: DISCONTINUED | OUTPATIENT
Start: 2022-10-25 | End: 2022-10-28 | Stop reason: HOSPADM

## 2022-10-25 RX ORDER — SIMETHICONE 80 MG
1 TABLET,CHEWABLE ORAL 4 TIMES DAILY PRN
Status: DISCONTINUED | OUTPATIENT
Start: 2022-10-25 | End: 2022-10-28 | Stop reason: HOSPADM

## 2022-10-25 RX ORDER — IBUPROFEN 200 MG
16 TABLET ORAL
Status: DISCONTINUED | OUTPATIENT
Start: 2022-10-25 | End: 2022-10-28 | Stop reason: HOSPADM

## 2022-10-25 RX ORDER — BISACODYL 10 MG
10 SUPPOSITORY, RECTAL RECTAL DAILY PRN
Status: DISCONTINUED | OUTPATIENT
Start: 2022-10-25 | End: 2022-10-28 | Stop reason: HOSPADM

## 2022-10-25 RX ORDER — SODIUM CHLORIDE 0.9 % (FLUSH) 0.9 %
10 SYRINGE (ML) INJECTION EVERY 12 HOURS PRN
Status: DISCONTINUED | OUTPATIENT
Start: 2022-10-25 | End: 2022-10-28 | Stop reason: HOSPADM

## 2022-10-25 RX ORDER — IBUPROFEN 200 MG
24 TABLET ORAL
Status: DISCONTINUED | OUTPATIENT
Start: 2022-10-25 | End: 2022-10-28 | Stop reason: HOSPADM

## 2022-10-25 RX ORDER — NALOXONE HCL 0.4 MG/ML
0.02 VIAL (ML) INJECTION
Status: DISCONTINUED | OUTPATIENT
Start: 2022-10-25 | End: 2022-10-28 | Stop reason: HOSPADM

## 2022-10-25 RX ORDER — MAG HYDROX/ALUMINUM HYD/SIMETH 200-200-20
30 SUSPENSION, ORAL (FINAL DOSE FORM) ORAL 4 TIMES DAILY PRN
Status: DISCONTINUED | OUTPATIENT
Start: 2022-10-25 | End: 2022-10-28 | Stop reason: HOSPADM

## 2022-10-25 RX ORDER — IPRATROPIUM BROMIDE AND ALBUTEROL SULFATE 2.5; .5 MG/3ML; MG/3ML
3 SOLUTION RESPIRATORY (INHALATION) EVERY 4 HOURS PRN
Status: DISCONTINUED | OUTPATIENT
Start: 2022-10-25 | End: 2022-10-28 | Stop reason: HOSPADM

## 2022-10-25 RX ORDER — POLYETHYLENE GLYCOL 3350 17 G/17G
17 POWDER, FOR SOLUTION ORAL DAILY
Status: DISCONTINUED | OUTPATIENT
Start: 2022-10-26 | End: 2022-10-28 | Stop reason: HOSPADM

## 2022-10-25 RX ORDER — HYDROCODONE BITARTRATE AND ACETAMINOPHEN 5; 325 MG/1; MG/1
1 TABLET ORAL EVERY 6 HOURS PRN
Status: DISCONTINUED | OUTPATIENT
Start: 2022-10-25 | End: 2022-10-28 | Stop reason: HOSPADM

## 2022-10-25 RX ORDER — PROCHLORPERAZINE EDISYLATE 5 MG/ML
5 INJECTION INTRAMUSCULAR; INTRAVENOUS EVERY 6 HOURS PRN
Status: DISCONTINUED | OUTPATIENT
Start: 2022-10-25 | End: 2022-10-28 | Stop reason: HOSPADM

## 2022-10-25 RX ORDER — HEPARIN SODIUM 5000 [USP'U]/ML
5000 INJECTION, SOLUTION INTRAVENOUS; SUBCUTANEOUS EVERY 8 HOURS
Status: DISCONTINUED | OUTPATIENT
Start: 2022-10-26 | End: 2022-10-28 | Stop reason: HOSPADM

## 2022-10-25 RX ORDER — ACETAMINOPHEN 325 MG/1
650 TABLET ORAL EVERY 4 HOURS PRN
Status: DISCONTINUED | OUTPATIENT
Start: 2022-10-25 | End: 2022-10-28 | Stop reason: HOSPADM

## 2022-10-25 RX ORDER — TALC
6 POWDER (GRAM) TOPICAL NIGHTLY PRN
Status: DISCONTINUED | OUTPATIENT
Start: 2022-10-25 | End: 2022-10-28 | Stop reason: HOSPADM

## 2022-10-25 RX ADMIN — SODIUM CHLORIDE 1000 ML: 0.9 INJECTION, SOLUTION INTRAVENOUS at 04:10

## 2022-10-25 RX ADMIN — KETOROLAC TROMETHAMINE 15 MG: 30 INJECTION, SOLUTION INTRAMUSCULAR at 05:10

## 2022-10-25 RX ADMIN — LABETALOL HYDROCHLORIDE 200 MG: 100 TABLET, FILM COATED ORAL at 09:10

## 2022-10-25 RX ADMIN — ACETAMINOPHEN 1000 MG: 500 TABLET ORAL at 04:10

## 2022-10-25 RX ADMIN — IOHEXOL 100 ML: 350 INJECTION, SOLUTION INTRAVENOUS at 05:10

## 2022-10-25 RX ADMIN — CEFTRIAXONE 2 G: 2 INJECTION, SOLUTION INTRAVENOUS at 04:10

## 2022-10-25 RX ADMIN — ACETAMINOPHEN 650 MG: 325 TABLET ORAL at 11:10

## 2022-10-25 NOTE — Clinical Note
Diagnosis: Pyelonephritis [976032]   Admitting Provider:: NERY DUONG [[id number]]   Future Attending Provider: NERY DUONG [[id number]]   Reason for IP Medical Treatment  (Clinical interventions that can only be accomplished in the IP setting? ) :: Abx, IVFs, supportive care   Estimated Length of Stay:: 2 midnights   I certify that Inpatient services for greater than or equal to 2 midnights are medically necessary:: Yes   Plans for Post-Acute care--if anticipated (pick the single best option):: A. No post acute care anticipated at this time

## 2022-10-25 NOTE — ED PROVIDER NOTES
"Encounter Date: 10/25/2022    SCRIBE #1 NOTE: I, Allyssa Joe, am scribing for, and in the presence of,  Bradley Beltran MD. I have scribed the following portions of the note - Other sections scribed: HPI; ROS.     History     Chief Complaint   Patient presents with    Abdominal Pain    Fever     Pt states," I have pains in my stomach for three days and I get hot and cold."     Kriss Alvarez is a 36 y.o. female with Hx of HTN who presents to the ED for chief complaint of worsening epigastric abdominal pain onset 1 day ago. Patient has associated nausea and diarrhea. Patient reports she does not have any abdominal SHx. She states her children have URI symptoms. Patient did not attempt treatment at home. She denies vomiting, sore throat, congestion, hematuria, urgency, dysuria, or shortness of breath. No further complaints at this time.       The history is provided by the patient. No  was used.   Review of patient's allergies indicates:  No Known Allergies  Past Medical History:   Diagnosis Date    Hypertension      No past surgical history on file.  No family history on file.  Social History     Tobacco Use    Smoking status: Never     Review of Systems   Constitutional: Negative.    HENT: Negative.  Negative for congestion.    Eyes: Negative.    Respiratory: Negative.  Negative for shortness of breath.    Cardiovascular: Negative.    Gastrointestinal:  Positive for abdominal pain, diarrhea and nausea. Negative for vomiting.   Endocrine: Negative.    Genitourinary: Negative.  Negative for dysuria, hematuria and urgency.   Musculoskeletal: Negative.    Skin: Negative.    Allergic/Immunologic: Negative.    Neurological: Negative.    Hematological: Negative.    Psychiatric/Behavioral: Negative.       Physical Exam     Initial Vitals [10/25/22 1531]   BP Pulse Resp Temp SpO2   (!) 168/110 (!) 124 18 (!) 103 °F (39.4 °C) 99 %      MAP       --         Physical Exam    Nursing note and vitals " reviewed.  Constitutional: She appears well-developed. She is not diaphoretic. She appears distressed (mildly).   HENT:   Head: Normocephalic and atraumatic.   Nose: Nose normal.   Eyes: EOM are normal. Pupils are equal, round, and reactive to light. Right eye exhibits no discharge. Left eye exhibits no discharge.   Neck: Neck supple. No tracheal deviation present. No JVD present.   Normal range of motion.  Cardiovascular:  Regular rhythm, normal heart sounds and intact distal pulses.           tachycardic   Pulmonary/Chest: No stridor. She is in respiratory distress (mild tachypnea). She has no wheezes. She has no rhonchi. She has no rales.   Abdominal: Abdomen is soft. Bowel sounds are normal. She exhibits no distension. There is abdominal tenderness (epigastric ttp). There is no rebound and no guarding.   Musculoskeletal:         General: No tenderness or edema. Normal range of motion.      Cervical back: Normal range of motion and neck supple.     Neurological: She is alert and oriented to person, place, and time. She has normal strength.   Skin: Skin is warm and dry. Capillary refill takes less than 2 seconds. No rash noted. No erythema.       ED Course   Critical Care    Date/Time: 10/25/2022 3:50 PM  Performed by: Bradley Beltran MD  Authorized by: Bradley Beltran MD   Direct patient critical care time: 15 minutes  Additional history critical care time: 5 minutes  Ordering / reviewing critical care time: 5 minutes  Documentation critical care time: 5 minutes  Consulting other physicians critical care time: 5 minutes  Total critical care time (exclusive of procedural time) : 35 minutes  Critical care time was exclusive of separately billable procedures and treating other patients and teaching time.  Critical care was necessary to treat or prevent imminent or life-threatening deterioration of the following conditions: circulatory failure, shock and sepsis.  Critical care was time spent personally by  me on the following activities: development of treatment plan with patient or surrogate, discussions with consultants, evaluation of patient's response to treatment, examination of patient, obtaining history from patient or surrogate, ordering and performing treatments and interventions, ordering and review of laboratory studies, ordering and review of radiographic studies, re-evaluation of patient's condition and review of old charts.      Labs Reviewed   POCT URINALYSIS W/O SCOPE - Abnormal; Notable for the following components:       Result Value    Bilirubin, UA 1+ (*)     Ketones, UA Trace (*)     Blood, UA 3+ (*)     Protein, UA 3+ (*)     Urobilinogen, UA 4.0 (*)     Nitrite, UA Positive (*)     Leukocytes, UA 1+ (*)     All other components within normal limits   POCT CMP - Abnormal; Notable for the following components:    POC Glucose 131 (*)     POC Potassium 3.4 (*)     Protein, POC 8.4 (*)     All other components within normal limits   ISTAT PROCEDURE - Abnormal; Notable for the following components:    POC PCO2 45.2 (*)     POC PO2 163 (*)     POC Lactate 2.34 (*)     All other components within normal limits   ISTAT PROCEDURE - Abnormal; Notable for the following components:    POC HCO3 23.3 (*)     POC SATURATED O2 86 (*)     All other components within normal limits   CULTURE, URINE   POCT URINE PREGNANCY   POCT URINALYSIS W/O SCOPE   POCT CBC   POCT URINALYSIS W/O SCOPE   SARS-COV-2 RDRP GENE    Narrative:     This test utilizes isothermal nucleic acid amplification   technology to detect the SARS-CoV-2 RdRp nucleic acid segment.   The analytical sensitivity (limit of detection) is 125 genome   equivalents/mL.   A POSITIVE result implies infection with the SARS-CoV-2 virus;   the patient is presumed to be contagious.     A NEGATIVE result means that SARS-CoV-2 nucleic acids are not   present above the limit of detection. A NEGATIVE result should be   treated as presumptive. It does not rule out the  possibility of   COVID-19 and should not be the sole basis for treatment decisions.   If COVID-19 is strongly suspected based on clinical and exposure   history, re-testing using an alternate molecular assay should be   considered.   This test is only for use under the Food and Drug   Administration s Emergency Use Authorization (EUA).   Commercial kits are provided by [employer].   Performance characteristics of the EUA have been independently   verified by [facility] Department of   Pathology and Laboratory Medicine.   _________________________________________________________________   The authorized Fact Sheet for Healthcare Providers and the authorized Fact   Sheet for Patients of the ID NOW COVID-19 are available on the FDA   website:     https://www.fda.gov/media/831399/download  https://www.fda.gov/media/334596/download         POCT INFLUENZA A/B MOLECULAR   POCT STREP A MOLECULAR   POCT CMP   POCT RAPID INFLUENZA A/B   POCT STREP A, RAPID            Imaging Results               CT Abdomen Pelvis With Contrast (Final result)  Result time 10/25/22 17:21:53      Final result by Dario Riggs MD (10/25/22 17:21:53)                   Impression:      This report was flagged in Epic as abnormal.    1. Delayed enhancement of the left kidney in left perinephric inflammation.  Findings suggest sequela of developing obstruction related to 1 cm calculus within the upper pole collecting system.  Correlation is advised as superimposed infection not excluded.  Differential for this finding would include sequela of recently passed calculus given additional left nonobstructive nephrolithiasis.  Correlation is advised.  2. Possible hepatic steatosis, correlation with LFTs recommended.  3. Cholelithiasis without secondary findings to suggest acute cholecystitis.  4. Additional findings above.      Electronically signed by: Dario Riggs MD  Date:    10/25/2022  Time:    17:21                Narrative:    EXAMINATION:  CT ABDOMEN PELVIS WITH CONTRAST    CLINICAL HISTORY:  Abdominal pain, acute, nonlocalized;    TECHNIQUE:  Low dose axial images, sagittal and coronal reformations were obtained from the lung bases to the pubic symphysis following the IV administration of 100 mL of Omnipaque 350 no    COMPARISON:  None.    FINDINGS:  Images of the lower thorax are remarkable for minimal dependent atelectasis.    The liver is mildly hypoattenuating, possibly reflecting steatosis versus sequela of contrast phase.  Correlation with LFTs as warranted.  The spleen, pancreas and adrenal glands are unremarkable.  There is cholelithiasis without secondary findings to suggest acute cholecystitis.  The portal vein, splenic vein, SMV, celiac axis and SMA all are patent.  No significant abdominal lymphadenopathy.    The kidneys enhance asymmetrically noting delayed enhancement of the left kidney as compared to the right.  There is no right hydronephrosis or right nephrolithiasis.  The right ureter is unremarkable without calculi seen.  There is left perinephric and periureteral inflammation.  There is a prominent calculus within the central left renal collecting system measuring 1 cm.  There is additional punctate left nonobstructive nephrolithiasis.  The left ureter is unable to be followed in its entirety to the urinary bladder, no definite calculi seen along its course.  The urinary bladder is unremarkable.  The uterus and adnexa are unremarkable.    There are a few scattered colonic diverticula without inflammation.  The terminal ileum and appendix are unremarkable.  The small bowel is grossly unremarkable.  No focal organized pelvic fluid collection.  There are a few scattered shotty periaortic and paracaval lymph nodes.    There are degenerative changes of the bilateral sacroiliac joints, pubic symphysis, and spine.  No significant inguinal lymphadenopathy.                                       X-Ray Chest AP  Portable (Final result)  Result time 10/25/22 16:32:28      Final result by Vicki Dillon MD (10/25/22 16:32:28)                   Impression:      No acute intrathoracic process seen.      Electronically signed by: Vicki Dillon MD  Date:    10/25/2022  Time:    16:32               Narrative:    EXAMINATION:  XR CHEST AP PORTABLE    CLINICAL HISTORY:  Sepsis;    TECHNIQUE:  Single frontal view of the chest was performed.    COMPARISON:  None    FINDINGS:  The cardiac silhouette is normal in size.  The pulmonary vascularity is normal.  The lungs are well inflated and clear.  No focal airspace disease, no pleural effusion, no pneumothorax.                                       Medications   sodium chloride 0.9% flush 10 mL (has no administration in time range)   heparin (porcine) injection 5,000 Units (5,000 Units Subcutaneous Given 10/26/22 0525)   albuterol-ipratropium 2.5 mg-0.5 mg/3 mL nebulizer solution 3 mL (has no administration in time range)   melatonin tablet 6 mg (has no administration in time range)   ondansetron injection 4 mg (has no administration in time range)   prochlorperazine injection Soln 5 mg (has no administration in time range)   polyethylene glycol packet 17 g (17 g Oral Given 10/26/22 0803)   bisacodyL suppository 10 mg (has no administration in time range)   acetaminophen tablet 650 mg (650 mg Oral Given 10/26/22 0802)   simethicone chewable tablet 80 mg (has no administration in time range)   aluminum-magnesium hydroxide-simethicone 200-200-20 mg/5 mL suspension 30 mL (has no administration in time range)   acetaminophen tablet 650 mg (has no administration in time range)   HYDROcodone-acetaminophen 5-325 mg per tablet 1 tablet (has no administration in time range)   naloxone 0.4 mg/mL injection 0.02 mg (has no administration in time range)   potassium bicarbonate disintegrating tablet 50 mEq (has no administration in time range)   potassium bicarbonate disintegrating tablet  35 mEq (has no administration in time range)   potassium bicarbonate disintegrating tablet 60 mEq (has no administration in time range)   potassium, sodium phosphates 280-160-250 mg packet 2 packet (has no administration in time range)   potassium, sodium phosphates 280-160-250 mg packet 2 packet (has no administration in time range)   potassium, sodium phosphates 280-160-250 mg packet 2 packet (has no administration in time range)   glucose chewable tablet 16 g (has no administration in time range)   glucose chewable tablet 24 g (has no administration in time range)   glucagon (human recombinant) injection 1 mg (has no administration in time range)   dextrose 10% bolus 125 mL (has no administration in time range)   dextrose 10% bolus 250 mL (has no administration in time range)   labetaloL tablet 200 mg (200 mg Oral Given 10/26/22 0802)   cefTRIAXone (ROCEPHIN) 2 g/50 mL D5W IVPB (0 g Intravenous Stopped 10/26/22 0908)   magnesium sulfate 2g in water 50mL IVPB (premix) (has no administration in time range)   acetaminophen tablet 1,000 mg (1,000 mg Oral Given 10/25/22 1602)   sodium chloride 0.9% bolus 1,000 mL (0 mLs Intravenous Stopped 10/25/22 1722)   sodium chloride 0.9% bolus 1,000 mL (0 mLs Intravenous Stopped 10/25/22 1754)   cefTRIAXone (ROCEPHIN) 2 g/50 mL D5W IVPB (0 g Intravenous Stopped 10/25/22 1747)   iohexoL (OMNIPAQUE 350) injection 100 mL (100 mLs Intravenous Given 10/25/22 1706)   ketorolac injection 15 mg (15 mg Intravenous Given 10/25/22 1738)   lactated ringers bolus 1,000 mL (0 mLs Intravenous Stopped 10/26/22 0304)     Medical Decision Making:   History:   Old Medical Records: I decided to obtain old medical records.  Independently Interpreted Test(s):   I have ordered and independently interpreted X-rays - see prior notes.  I have ordered and independently interpreted EKG Reading(s) - see prior notes  Clinical Tests:   Lab Tests: Reviewed and Ordered  The following lab test(s) were  unremarkable: UPT  Radiological Study: Ordered and Reviewed  Medical Tests: Ordered and Reviewed       MDM:    36-year-old female past medical history as noted above presenting with abdominal pain, fever.  Physical exam as noted above.  ED workup notable for urinalysis with nitrite positive urine, white blood cell count 18, CT scan concerning for pyelonephritis.  Patient presentation initially concerning for sepsis, tachycardia, febrile, with elevated white blood cell count.  Urinary source suspected given initial urinalysis, Rocephin given, IV fluids given, blood cultures were drawn.  Initial lactate elevated 2.34, repeat pending.  No evidence for septic shock at this point.  Patient's vital signs improving upon reassessment.  CT scan showing 1 cm kidney stone in the upper pole, do not suspect infected kidney stone at this point.  Discussed further findings with patient and significant other bedside, all questions were answered.  Discussed further with hospitalist will admit for further evaluation and management. Patient transferred to floor improved and stable.         Scribe Attestation:   Scribe #1: I performed the above scribed service and the documentation accurately describes the services I performed. I attest to the accuracy of the note.            Scribe attestation: I, Bradley Beltran M.D., personally performed the services described in this documentation.  All medical record entries made by the scribe were at my direction and in my presence.  I have reviewed the chart and agree that the record reflects my personal performance and is accurate and complete.         Clinical Impression:   Final diagnoses:  [R00.0] Tachycardia  [N12] Pyelonephritis (Primary)        ED Disposition Condition    Admit Stable                Bradley Beltran MD  10/26/22 0955

## 2022-10-25 NOTE — CLINICAL REVIEW
IP Sepsis Screen (most recent)       Sepsis Screen (IP) - 10/25/22 9514       Is the patient's history or complaint suggestive of a possible infection? Yes  -JM    Are there at least two of the following signs and symptoms present? Yes  -JM    Sepsis signs/symptoms - Hyper or Hypothermia Hyperthermia >100.4 or Hypothermia < 96.8  -    Sepsis signs/symptoms - Tachycardia Tachycardia     >90  -JM    Are any of the following organ dysfunction criteria present and not considered to be due to a chronic condition? Yes  -    Organ Dysfunction Criteria Lactate > 2.0  -    Initiate Sepsis Protocol No  -JM    Reason sepsis not considered Pt. receiving appropriate management  -              User Key  (r) = Recorded By, (t) = Taken By, (c) = Cosigned By      Initials Name    LEXIS Saldivar RN

## 2022-10-26 PROBLEM — R78.81 BACTEREMIA: Status: ACTIVE | Noted: 2022-10-26

## 2022-10-26 LAB
ANION GAP SERPL CALC-SCNC: 11 MMOL/L (ref 8–16)
BASOPHILS # BLD AUTO: 0.02 K/UL (ref 0–0.2)
BASOPHILS NFR BLD: 0.2 % (ref 0–1.9)
BUN SERPL-MCNC: 9 MG/DL (ref 6–20)
CALCIUM SERPL-MCNC: 8.6 MG/DL (ref 8.7–10.5)
CHLORIDE SERPL-SCNC: 104 MMOL/L (ref 95–110)
CO2 SERPL-SCNC: 21 MMOL/L (ref 23–29)
CREAT SERPL-MCNC: 0.8 MG/DL (ref 0.5–1.4)
DIFFERENTIAL METHOD: ABNORMAL
EOSINOPHIL # BLD AUTO: 0 K/UL (ref 0–0.5)
EOSINOPHIL NFR BLD: 0.2 % (ref 0–8)
ERYTHROCYTE [DISTWIDTH] IN BLOOD BY AUTOMATED COUNT: 14.7 % (ref 11.5–14.5)
EST. GFR  (NO RACE VARIABLE): >60 ML/MIN/1.73 M^2
GLUCOSE SERPL-MCNC: 130 MG/DL (ref 70–110)
HCT VFR BLD AUTO: 31.2 % (ref 37–48.5)
HGB BLD-MCNC: 9.6 G/DL (ref 12–16)
IMM GRANULOCYTES # BLD AUTO: 0.07 K/UL (ref 0–0.04)
IMM GRANULOCYTES NFR BLD AUTO: 0.6 % (ref 0–0.5)
LACTATE SERPL-SCNC: 0.9 MMOL/L (ref 0.5–2.2)
LYMPHOCYTES # BLD AUTO: 0.8 K/UL (ref 1–4.8)
LYMPHOCYTES NFR BLD: 7 % (ref 18–48)
MAGNESIUM SERPL-MCNC: 1.6 MG/DL (ref 1.6–2.6)
MCH RBC QN AUTO: 29.1 PG (ref 27–31)
MCHC RBC AUTO-ENTMCNC: 30.8 G/DL (ref 32–36)
MCV RBC AUTO: 95 FL (ref 82–98)
MONOCYTES # BLD AUTO: 1 K/UL (ref 0.3–1)
MONOCYTES NFR BLD: 8.2 % (ref 4–15)
NEUTROPHILS # BLD AUTO: 10.1 K/UL (ref 1.8–7.7)
NEUTROPHILS NFR BLD: 83.8 % (ref 38–73)
NRBC BLD-RTO: 0 /100 WBC
PHOSPHATE SERPL-MCNC: 2.6 MG/DL (ref 2.7–4.5)
PLATELET # BLD AUTO: 243 K/UL (ref 150–450)
PMV BLD AUTO: 9.2 FL (ref 9.2–12.9)
POTASSIUM SERPL-SCNC: 3.7 MMOL/L (ref 3.5–5.1)
RBC # BLD AUTO: 3.3 M/UL (ref 4–5.4)
SODIUM SERPL-SCNC: 136 MMOL/L (ref 136–145)
WBC # BLD AUTO: 12.07 K/UL (ref 3.9–12.7)

## 2022-10-26 PROCEDURE — 11000001 HC ACUTE MED/SURG PRIVATE ROOM

## 2022-10-26 PROCEDURE — 80048 BASIC METABOLIC PNL TOTAL CA: CPT | Performed by: STUDENT IN AN ORGANIZED HEALTH CARE EDUCATION/TRAINING PROGRAM

## 2022-10-26 PROCEDURE — 25000003 PHARM REV CODE 250: Performed by: STUDENT IN AN ORGANIZED HEALTH CARE EDUCATION/TRAINING PROGRAM

## 2022-10-26 PROCEDURE — 63600175 PHARM REV CODE 636 W HCPCS: Performed by: STUDENT IN AN ORGANIZED HEALTH CARE EDUCATION/TRAINING PROGRAM

## 2022-10-26 PROCEDURE — 85025 COMPLETE CBC W/AUTO DIFF WBC: CPT | Performed by: STUDENT IN AN ORGANIZED HEALTH CARE EDUCATION/TRAINING PROGRAM

## 2022-10-26 PROCEDURE — 36415 COLL VENOUS BLD VENIPUNCTURE: CPT | Performed by: STUDENT IN AN ORGANIZED HEALTH CARE EDUCATION/TRAINING PROGRAM

## 2022-10-26 PROCEDURE — 94760 N-INVAS EAR/PLS OXIMETRY 1: CPT

## 2022-10-26 PROCEDURE — 99223 1ST HOSP IP/OBS HIGH 75: CPT | Mod: ,,, | Performed by: UROLOGY

## 2022-10-26 PROCEDURE — 83735 ASSAY OF MAGNESIUM: CPT | Performed by: STUDENT IN AN ORGANIZED HEALTH CARE EDUCATION/TRAINING PROGRAM

## 2022-10-26 PROCEDURE — 83605 ASSAY OF LACTIC ACID: CPT | Performed by: STUDENT IN AN ORGANIZED HEALTH CARE EDUCATION/TRAINING PROGRAM

## 2022-10-26 PROCEDURE — 99223 PR INITIAL HOSPITAL CARE,LEVL III: ICD-10-PCS | Mod: ,,, | Performed by: UROLOGY

## 2022-10-26 PROCEDURE — 99900035 HC TECH TIME PER 15 MIN (STAT)

## 2022-10-26 PROCEDURE — 87040 BLOOD CULTURE FOR BACTERIA: CPT | Mod: 59 | Performed by: STUDENT IN AN ORGANIZED HEALTH CARE EDUCATION/TRAINING PROGRAM

## 2022-10-26 PROCEDURE — 84100 ASSAY OF PHOSPHORUS: CPT | Performed by: STUDENT IN AN ORGANIZED HEALTH CARE EDUCATION/TRAINING PROGRAM

## 2022-10-26 RX ORDER — MAGNESIUM SULFATE HEPTAHYDRATE 40 MG/ML
2 INJECTION, SOLUTION INTRAVENOUS ONCE
Status: COMPLETED | OUTPATIENT
Start: 2022-10-26 | End: 2022-10-26

## 2022-10-26 RX ORDER — ACETAMINOPHEN 500 MG
1000 TABLET ORAL EVERY 8 HOURS
Status: DISCONTINUED | OUTPATIENT
Start: 2022-10-26 | End: 2022-10-28 | Stop reason: HOSPADM

## 2022-10-26 RX ADMIN — HEPARIN SODIUM 5000 UNITS: 5000 INJECTION INTRAVENOUS; SUBCUTANEOUS at 05:10

## 2022-10-26 RX ADMIN — POLYETHYLENE GLYCOL 3350 17 G: 17 POWDER, FOR SOLUTION ORAL at 08:10

## 2022-10-26 RX ADMIN — ACETAMINOPHEN 650 MG: 325 TABLET ORAL at 04:10

## 2022-10-26 RX ADMIN — ACETAMINOPHEN 1000 MG: 500 TABLET ORAL at 06:10

## 2022-10-26 RX ADMIN — LABETALOL HYDROCHLORIDE 200 MG: 100 TABLET, FILM COATED ORAL at 08:10

## 2022-10-26 RX ADMIN — SODIUM CHLORIDE, SODIUM LACTATE, POTASSIUM CHLORIDE, AND CALCIUM CHLORIDE 1000 ML: .6; .31; .03; .02 INJECTION, SOLUTION INTRAVENOUS at 02:10

## 2022-10-26 RX ADMIN — MAGNESIUM SULFATE HEPTAHYDRATE 2 G: 40 INJECTION, SOLUTION INTRAVENOUS at 10:10

## 2022-10-26 RX ADMIN — LABETALOL HYDROCHLORIDE 200 MG: 100 TABLET, FILM COATED ORAL at 09:10

## 2022-10-26 RX ADMIN — CEFTRIAXONE SODIUM 2 G: 2 INJECTION, POWDER, FOR SOLUTION INTRAMUSCULAR; INTRAVENOUS at 08:10

## 2022-10-26 RX ADMIN — ACETAMINOPHEN 650 MG: 325 TABLET ORAL at 08:10

## 2022-10-26 RX ADMIN — HEPARIN SODIUM 5000 UNITS: 5000 INJECTION INTRAVENOUS; SUBCUTANEOUS at 01:10

## 2022-10-26 NOTE — ASSESSMENT & PLAN NOTE
- CT A/P showed delayed enhancement of the left kidney in left perinephric inflammation.  Findings suggest sequela of developing obstruction related to 1 cm calculus within the upper pole collecting system  - Concern for an infected stone     Plan:   - Urology consult   - See plan for pyelonephritis

## 2022-10-26 NOTE — NURSING
Pt arrived to the [facility] via bed to room 216.  Pt able to transfer to bed independently.  IV's flushed and saline locked, pt on room air.  Pt denies pain.  Diet in place, food and water given.  Pt oriented to room and staff, all questions answered and medications given.  Bed in low position, wheels locked, call light in reach for assistance, will continue to monitor.    Patent

## 2022-10-26 NOTE — HPI
Urolithiasis  Patient complains of left abdominal pain with radiation to the abdomen. Onset of symptoms was abrupt starting 1 day ago with rapidly improving course since that time. Patient describes the pain as aching and colicky,  intermittent  and rated as moderate. The patient has had no nausea/no vomiting and no diaphoresis. There has been fever and chills. The patient is not complaining of dysuria or frequency. Risk factors for urolithiasis: history of stone disease.

## 2022-10-26 NOTE — NURSING
[facility] MEWS TRIGGER FOLLOW UP       MEWS Monitoring, Score is: 5  Indication for review: HR and Temp    Bedside Nurse, Khadijah baker MD aware/ following, instructed to call [phone] for further concerns or assistance.    Patient just admitted to 416 from ED. Temp 102.2 and 115. Here for pyelonephritis. PRN tylenol given. Temp improved to 99.1 and 107 HR.

## 2022-10-26 NOTE — HOSPITAL COURSE
Patient admitted with severe sepsis, source pyelonephritis.  Found to be bacteremic with GNR.  Ceftriaxone increased to 2g q24.  Repeat blood cultures ordered.  Urology consulted. E.coli urosepsis/bacteremia, sensitive to CTX, will continue. HR up to 150 with fever, scheduling high dose tylenol.

## 2022-10-26 NOTE — PROGRESS NOTES
[facility]  Progress Note    Patient Name: Kriss Alvarez  MRN: [id number]  Patient Class: IP- Inpatient   Admission Date: 10/25/2022  Length of Stay: 1 days  Attending Physician: Dominick Mejia MD  Primary Care Provider: To Obtain Unable        Subjective:     Principal Problem:Sepsis        HPI:  This is a 36 year old female with a PMHx of morbid obesity, HTN who presents with abdominal pain and fevers.     Patient reports developing epigastric abdominal pain that started 1 day prior to presentation. She had no relieving or aggravating factors and the pain was not radiating. She denied having any associated symptoms including nausea, vomiting, diarrhea, constipation but did have chills at home. Her children are sick with URI symptoms. She denies having any dysuria, urinary frequency or SOB. In the ED, she was febrile (39.4), tachycardic (120s), otherwise stable. Labs showed leukocytosis (18.0). UA showed +1 leukocytes, positive nitrites and +3 blood.  CXR was negative. CT A/P showed delayed enhancement of the left kidney in left perinephric inflammation.  Findings suggest sequela of developing obstruction related to 1 cm calculus within the upper pole collecting system.  She was given fluids, and ceftriaxone. The patient was admitted for further management.       Overview/Hospital Course:  Patient admitted with severe sepsis, source pyelonephritis.  Found to be bacteremic with GNR.  Ceftriaxone increased to 2g q24.  Repeat blood cultures ordered.  Urology consulted.      NAEON. Denies SOB or CP.   No complaints       Review of Systems   Constitutional: Negative.    HENT: Negative.     Eyes: Negative.    Respiratory: Negative.     Cardiovascular: Negative.    Gastrointestinal:  Positive for abdominal pain.   Endocrine: Negative.    Genitourinary: Negative.    Musculoskeletal: Negative.    Skin: Negative.    Allergic/Immunologic: Negative.    Neurological: Negative.     Psychiatric/Behavioral: Negative.     Objective:     Vital Signs (Most Recent):  Temp: 98.8 °F (37.1 °C) (10/26/22 0510)  Pulse: (!) 113 (10/26/22 0510)  Resp: 19 (10/26/22 0510)  BP: 133/74 (10/26/22 0510)  SpO2: 99 % (10/26/22 0510)   Vital Signs (24h Range):  Temp:  [98.8 °F (37.1 °C)-103 °F (39.4 °C)] 98.8 °F (37.1 °C)  Pulse:  [103-133] 113  Resp:  [18-20] 19  SpO2:  [97 %-99 %] 99 %  BP: (127-168)/() 133/74     Weight: (!) 152.9 kg (337 lb)  Body mass index is 51.24 kg/m².    Physical Exam  Vitals and nursing note reviewed.   Constitutional:       General: She is not in acute distress.     Appearance: Normal appearance. She is not ill-appearing.   HENT:      Head: Normocephalic and atraumatic.      Nose: Nose normal.      Mouth/Throat:      Mouth: Mucous membranes are moist.   Eyes:      Extraocular Movements: Extraocular movements intact.   Cardiovascular:      Rate and Rhythm: Normal rate.      Pulses: Normal pulses.      Heart sounds: No murmur heard.  Pulmonary:      Effort: Pulmonary effort is normal. No respiratory distress.   Abdominal:      General: Abdomen is flat.      Palpations: Abdomen is soft.      Tenderness: There is abdominal tenderness.      Comments: Epigastric tenderness    Musculoskeletal:      Right lower leg: No edema.      Left lower leg: No edema.   Skin:     General: Skin is warm.      Capillary Refill: Capillary refill takes less than 2 seconds.   Neurological:      General: No focal deficit present.      Mental Status: She is alert.   Psychiatric:         Mood and Affect: Mood normal.               Recent Results (from the past 24 hour(s))   POCT URINALYSIS W/O SCOPE    Collection Time: 10/25/22  3:45 PM   Result Value Ref Range    Glucose, UA Negative     Bilirubin, UA 1+ (A)     Ketones, UA Trace (A)     Spec Grav UA 1.020     Blood, UA 3+ (A)     PH, UA 7.0     Protein, UA 3+ (A)     Urobilinogen, UA 4.0 (A) E.U./dL    Nitrite, UA Positive (P)     Leukocytes, UA 1+ (A)      Color, UA Yellow     Clarity, UA Clear    Blood culture x two cultures. Draw prior to antibiotics.    Collection Time: 10/25/22  3:50 PM    Specimen: Peripheral, Antecubital, Left; Blood   Result Value Ref Range    Blood Culture, Routine Gram stain waldemar bottle:     Blood Culture, Routine Gram negative rods     Blood Culture, Routine       Results called to and read back by: GEORGE OCONNOR RN  10/26/2022  05:15AW   POCT urine pregnancy    Collection Time: 10/25/22  3:51 PM   Result Value Ref Range    POC Preg Test, Ur Negative Negative     Acceptable Yes    POCT CMP    Collection Time: 10/25/22  4:00 PM   Result Value Ref Range    Albumin, POC 3.6 3.3 - 5.5 g/dL    Alkaline Phosphatase, POC 90 42 - 141 U/L    ALT (SGPT), POC 21 10 - 47 U/L    AST (SGOT), POC 19 11 - 38 U/L    POC BUN 10 7 - 22 mg/dL    Calcium, POC 10.1 8.0 - 10.3 mg/dL    POC Chloride 100 98 - 108 mmol/L    POC Creatinine 0.8 0.6 - 1.2 mg/dL    POC Glucose 131 (H) 73 - 118 mg/dL    POC Potassium 3.4 (L) 3.6 - 5.1 mmol/L    POC Sodium 138 128 - 145 mmol/L    Bilirubin, POC 1.1 0.2 - 1.6 mg/dL    POC TCO2 24 18 - 33 mmol/L    Protein, POC 8.4 (H) 6.4 - 8.1 g/dL   POCT Rapid Influenza A/B    Collection Time: 10/25/22  4:01 PM   Result Value Ref Range    Influenza B Ag negative Positive/Negative    Inflenza A Ag negative Positive/Negative   Blood culture x two cultures. Draw prior to antibiotics.    Collection Time: 10/25/22  4:14 PM    Specimen: Peripheral, Antecubital, Right; Blood   Result Value Ref Range    Blood Culture, Routine Gram stain waldemar bottle: Gram negative rods     Blood Culture, Routine       Results called to and read back by: GEORGE OCONNOR RN  10/26/2022  05:13AW   POCT Rapid Strep A    Collection Time: 10/25/22  4:43 PM   Result Value Ref Range    POC Rapid Strep A negative Positive/Negative   POCT COVID-19 Rapid Screening    Collection Time: 10/25/22  4:52 PM   Result Value Ref Range    POC Rapid COVID Negative Negative      Acceptable Yes    ISTAT PROCEDURE    Collection Time: 10/25/22  5:05 PM   Result Value Ref Range    POC PH 7.354 7.35 - 7.45    POC PCO2 45.2 (H) 35 - 45 mmHg    POC PO2 163 (HH) 40 - 60 mmHg    POC HCO3 25.2 24 - 28 mmol/L    POC BE -1 -2 to 2 mmol/L    POC SATURATED O2 99 95 - 100 %    POC Lactate 2.34 (H) 0.5 - 2.2 mmol/L    POC TCO2 27 24 - 29 mmol/L    Sample VENOUS     Site Other     Allens Test N/A    ISTAT PROCEDURE    Collection Time: 10/25/22  6:39 PM   Result Value Ref Range    POC PH 7.421 7.35 - 7.45    POC PCO2 35.9 35 - 45 mmHg    POC PO2 50 40 - 60 mmHg    POC HCO3 23.3 (L) 24 - 28 mmol/L    POC BE -1 -2 to 2 mmol/L    POC SATURATED O2 86 (L) 95 - 100 %    POC Lactate 0.74 0.5 - 2.2 mmol/L    POC TCO2 24 24 - 29 mmol/L    Sample VENOUS     Site Other     Allens Test N/A    Phosphorus    Collection Time: 10/26/22  5:42 AM   Result Value Ref Range    Phosphorus 2.6 (L) 2.7 - 4.5 mg/dL   Magnesium    Collection Time: 10/26/22  5:42 AM   Result Value Ref Range    Magnesium 1.6 1.6 - 2.6 mg/dL   Basic Metabolic Panel    Collection Time: 10/26/22  5:42 AM   Result Value Ref Range    Sodium 136 136 - 145 mmol/L    Potassium 3.7 3.5 - 5.1 mmol/L    Chloride 104 95 - 110 mmol/L    CO2 21 (L) 23 - 29 mmol/L    Glucose 130 (H) 70 - 110 mg/dL    BUN 9 6 - 20 mg/dL    Creatinine 0.8 0.5 - 1.4 mg/dL    Calcium 8.6 (L) 8.7 - 10.5 mg/dL    Anion Gap 11 8 - 16 mmol/L    eGFR >60 >60 mL/min/1.73 m^2   CBC Auto Differential    Collection Time: 10/26/22  5:42 AM   Result Value Ref Range    WBC 12.07 3.90 - 12.70 K/uL    RBC 3.30 (L) 4.00 - 5.40 M/uL    Hemoglobin 9.6 (L) 12.0 - 16.0 g/dL    Hematocrit 31.2 (L) 37.0 - 48.5 %    MCV 95 82 - 98 fL    MCH 29.1 27.0 - 31.0 pg    MCHC 30.8 (L) 32.0 - 36.0 g/dL    RDW 14.7 (H) 11.5 - 14.5 %    Platelets 243 150 - 450 K/uL    MPV 9.2 9.2 - 12.9 fL    Immature Granulocytes 0.6 (H) 0.0 - 0.5 %    Gran # (ANC) 10.1 (H) 1.8 - 7.7 K/uL    Immature Grans (Abs) 0.07  (H) 0.00 - 0.04 K/uL    Lymph # 0.8 (L) 1.0 - 4.8 K/uL    Mono # 1.0 0.3 - 1.0 K/uL    Eos # 0.0 0.0 - 0.5 K/uL    Baso # 0.02 0.00 - 0.20 K/uL    nRBC 0 0 /100 WBC    Gran % 83.8 (H) 38.0 - 73.0 %    Lymph % 7.0 (L) 18.0 - 48.0 %    Mono % 8.2 4.0 - 15.0 %    Eosinophil % 0.2 0.0 - 8.0 %    Basophil % 0.2 0.0 - 1.9 %    Differential Method Automated        Microbiology Results (last 7 days)       Procedure Component Value Units Date/Time    Blood culture x two cultures. Draw prior to antibiotics. [[id number]] Collected: 10/25/22 1550    Order Status: Completed Specimen: Blood from Peripheral, Antecubital, Left Updated: 10/26/22 0515     Blood Culture, Routine Gram stain waldemar bottle:      Gram negative rods      Results called to and read back by: GEORGE OCONNOR RN  10/26/2022  05:15AW    Narrative:      Aerobic and anaerobic    Blood culture x two cultures. Draw prior to antibiotics. [[id number]] Collected: 10/25/22 1614    Order Status: Completed Specimen: Blood from Peripheral, Antecubital, Right Updated: 10/26/22 0514     Blood Culture, Routine Gram stain waldemar bottle: Gram negative rods      Results called to and read back by: GEORGE OCONNOR RN  10/26/2022  05:13AW    Narrative:      Aerobic and anaerobic    Urine culture [[id number]] Collected: 10/25/22 1618    Order Status: Sent Specimen: Urine, Clean Catch Updated: 10/25/22 1720             Imaging Results               CT Abdomen Pelvis With Contrast (Final result)  Result time 10/25/22 17:21:53      Final result by Dario Riggs MD (10/25/22 17:21:53)                   Impression:      This report was flagged in Epic as abnormal.    1. Delayed enhancement of the left kidney in left perinephric inflammation.  Findings suggest sequela of developing obstruction related to 1 cm calculus within the upper pole collecting system.  Correlation is advised as superimposed infection not excluded.  Differential for this finding would include sequela of recently passed  calculus given additional left nonobstructive nephrolithiasis.  Correlation is advised.  2. Possible hepatic steatosis, correlation with LFTs recommended.  3. Cholelithiasis without secondary findings to suggest acute cholecystitis.  4. Additional findings above.      Electronically signed by: Dario Riggs MD  Date:    10/25/2022  Time:    17:21               Narrative:    EXAMINATION:  CT ABDOMEN PELVIS WITH CONTRAST    CLINICAL HISTORY:  Abdominal pain, acute, nonlocalized;    TECHNIQUE:  Low dose axial images, sagittal and coronal reformations were obtained from the lung bases to the pubic symphysis following the IV administration of 100 mL of Omnipaque 350 no    COMPARISON:  None.    FINDINGS:  Images of the lower thorax are remarkable for minimal dependent atelectasis.    The liver is mildly hypoattenuating, possibly reflecting steatosis versus sequela of contrast phase.  Correlation with LFTs as warranted.  The spleen, pancreas and adrenal glands are unremarkable.  There is cholelithiasis without secondary findings to suggest acute cholecystitis.  The portal vein, splenic vein, SMV, celiac axis and SMA all are patent.  No significant abdominal lymphadenopathy.    The kidneys enhance asymmetrically noting delayed enhancement of the left kidney as compared to the right.  There is no right hydronephrosis or right nephrolithiasis.  The right ureter is unremarkable without calculi seen.  There is left perinephric and periureteral inflammation.  There is a prominent calculus within the central left renal collecting system measuring 1 cm.  There is additional punctate left nonobstructive nephrolithiasis.  The left ureter is unable to be followed in its entirety to the urinary bladder, no definite calculi seen along its course.  The urinary bladder is unremarkable.  The uterus and adnexa are unremarkable.    There are a few scattered colonic diverticula without inflammation.  The terminal ileum and appendix are  unremarkable.  The small bowel is grossly unremarkable.  No focal organized pelvic fluid collection.  There are a few scattered shotty periaortic and paracaval lymph nodes.    There are degenerative changes of the bilateral sacroiliac joints, pubic symphysis, and spine.  No significant inguinal lymphadenopathy.                                       X-Ray Chest AP Portable (Final result)  Result time 10/25/22 16:32:28      Final result by Vicki Dillon MD (10/25/22 16:32:28)                   Impression:      No acute intrathoracic process seen.      Electronically signed by: Vicki Dillon MD  Date:    10/25/2022  Time:    16:32               Narrative:    EXAMINATION:  XR CHEST AP PORTABLE    CLINICAL HISTORY:  Sepsis;    TECHNIQUE:  Single frontal view of the chest was performed.    COMPARISON:  None    FINDINGS:  The cardiac silhouette is normal in size.  The pulmonary vascularity is normal.  The lungs are well inflated and clear.  No focal airspace disease, no pleural effusion, no pneumothorax.                                            Assessment/Plan:      * Sepsis  This patient does have evidence of infective focus  My overall impression is sepsis. Vital signs were reviewed and noted in progress note.  Antibiotics given-   Antibiotics (From admission, onward)    None        Cultures were taken-   Microbiology Results (last 7 days)     Procedure Component Value Units Date/Time    Urine culture [[id number]] Collected: 10/25/22 1618    Order Status: Sent Specimen: Urine, Clean Catch Updated: 10/25/22 1720    Blood culture x two cultures. Draw prior to antibiotics. [[id number]] Collected: 10/25/22 1550    Order Status: Sent Specimen: Blood from Peripheral, Antecubital, Left Updated: 10/25/22 1715    Blood culture x two cultures. Draw prior to antibiotics. [[id number]] Collected: 10/25/22 1614    Order Status: Sent Specimen: Blood from Peripheral, Antecubital, Right Updated: 10/25/22 1715        Latest  lactate reviewed, they are-  No results for input(s): LACTATE in the last 72 hours.    Organ dysfunction indicated by N/A  Source- Urinary     Source control Achieved by- Antibiotics       Pyelonephritis  - Presented with abdominal pain, nausea and diarrhea.   - Septic on arrival, febrile (39.4), tachycardic (120s). Labs showed leukocytosis (18.0).   - UA showed +1 leukocytes, positive nitrites and +3 blood.    - CT A/P showed delayed enhancement of the left kidney in left perinephric inflammation.  Findings suggest sequela of developing obstruction related to 1 cm calculus within the upper pole collecting system.      Plan:   - Continue ceftriaxone   - F/U cultures and de-escalate      Bacteremia  GNR bacteremia  Ceft increased to 2g q24h  Repeat Blcx until clear  Urology consulted       Primary hypertension  Resume home medications     Renal calculus  - CT A/P showed delayed enhancement of the left kidney in left perinephric inflammation.  Findings suggest sequela of developing obstruction related to 1 cm calculus within the upper pole collecting system  - Concern for an infected stone     Plan:   - Urology consult   - See plan for pyelonephritis       VTE Risk Mitigation (From admission, onward)         Ordered     heparin (porcine) injection 5,000 Units  Every 8 hours         10/25/22 2026     IP VTE HIGH RISK PATIENT  Once         10/25/22 2026     Place sequential compression device  Until discontinued         10/25/22 2026                Discharge Planning   ARIEL:      Code Status: Full Code   Is the patient medically ready for discharge?:     Reason for patient still in hospital (select all that apply): Patient trending condition and Treatment                     Dominick Mejia MD  Department of Hospital Medicine   [facility]

## 2022-10-26 NOTE — H&P
"[facility]  History & Physical    Patient Name: Kriss Alvarez  MRN: [id number]  Patient Class: IP- Inpatient  Admission Date: 10/25/2022  Attending Physician: Dominick Mejia MD   Primary Care Provider: To Obtain Unable         Patient information was obtained from patient and ER records.     Subjective:     Principal Problem:Pyelonephritis    Chief Complaint:   Chief Complaint   Patient presents with    Abdominal Pain    Fever     Pt states," I have pains in my stomach for three days and I get hot and cold."        HPI: This is a 36 year old female with a PMHx of morbid obesity, HTN who presents with abdominal pain and fevers.     Patient reports developing epigastric abdominal pain that started 1 day prior to presentation. She had no relieving or aggravating factors and the pain was not radiating. She denied having any associated symptoms including nausea, vomiting, diarrhea, constipation but did have chills at home. Her children are sick with URI symptoms. She denies having any dysuria, urinary frequency or SOB. In the ED, she was febrile (39.4), tachycardic (120s), otherwise stable. Labs showed leukocytosis (18.0). UA showed +1 leukocytes, positive nitrites and +3 blood.  CXR was negative. CT A/P showed delayed enhancement of the left kidney in left perinephric inflammation.  Findings suggest sequela of developing obstruction related to 1 cm calculus within the upper pole collecting system.  She was given fluids, and ceftriaxone. The patient was admitted for further management.       Past Medical History:   Diagnosis Date    Hypertension        No past surgical history on file.    Review of patient's allergies indicates:  No Known Allergies    No current facility-administered medications on file prior to encounter.     Current Outpatient Medications on File Prior to Encounter   Medication Sig    labetaloL (NORMODYNE) 200 MG tablet Take 200 mg by mouth 2 (two) times daily.    " prenatal 25/iron fum/folic/dha (PRENATAL-1 ORAL) Take by mouth.     Family History    Influenza: Children       Tobacco Use    Smoking status: Never    Smokeless tobacco: Not on file   Substance and Sexual Activity    Alcohol use: Not on file    Drug use: Not on file    Sexual activity: Not on file     Review of Systems   Constitutional: Negative.    HENT: Negative.     Eyes: Negative.    Respiratory: Negative.     Cardiovascular: Negative.    Gastrointestinal:  Positive for abdominal pain.   Endocrine: Negative.    Genitourinary: Negative.    Musculoskeletal: Negative.    Skin: Negative.    Allergic/Immunologic: Negative.    Neurological: Negative.    Psychiatric/Behavioral: Negative.     Objective:     Vital Signs (Most Recent):  Temp: 98.9 °F (37.2 °C) (10/25/22 2121)  Pulse: 103 (10/25/22 2121)  Resp: 20 (10/25/22 2121)  BP: (!) 160/92 (10/25/22 2121)  SpO2: 99 % (10/25/22 2121)   Vital Signs (24h Range):  Temp:  [98.9 °F (37.2 °C)-103 °F (39.4 °C)] 98.9 °F (37.2 °C)  Pulse:  [103-133] 103  Resp:  [18-20] 20  SpO2:  [97 %-99 %] 99 %  BP: (129-168)/() 160/92     Weight: (!) 152.9 kg (337 lb)  Body mass index is 51.24 kg/m².    Physical Exam  Vitals and nursing note reviewed.   Constitutional:       General: She is not in acute distress.     Appearance: Normal appearance. She is not ill-appearing.   HENT:      Head: Normocephalic and atraumatic.      Nose: Nose normal.      Mouth/Throat:      Mouth: Mucous membranes are moist.   Eyes:      Extraocular Movements: Extraocular movements intact.   Cardiovascular:      Rate and Rhythm: Normal rate.      Pulses: Normal pulses.      Heart sounds: No murmur heard.  Pulmonary:      Effort: Pulmonary effort is normal. No respiratory distress.   Abdominal:      General: Abdomen is flat.      Palpations: Abdomen is soft.      Tenderness: There is abdominal tenderness.      Comments: Epigastric tenderness    Musculoskeletal:      Right lower leg: No edema.      Left  lower leg: No edema.   Skin:     General: Skin is warm.      Capillary Refill: Capillary refill takes less than 2 seconds.   Neurological:      General: No focal deficit present.      Mental Status: She is alert.   Psychiatric:         Mood and Affect: Mood normal.           Significant Labs: All pertinent labs within the past 24 hours have been reviewed.    Significant Imaging: I have reviewed all pertinent imaging results/findings within the past 24 hours.    Assessment/Plan:     * Pyelonephritis  - Presented with abdominal pain, nausea and diarrhea.   - Septic on arrival, febrile (39.4), tachycardic (120s). Labs showed leukocytosis (18.0).   - UA showed +1 leukocytes, positive nitrites and +3 blood.    - CT A/P showed delayed enhancement of the left kidney in left perinephric inflammation.  Findings suggest sequela of developing obstruction related to 1 cm calculus within the upper pole collecting system.      Plan:   - Continue ceftriaxone   - F/U cultures and de-escalate      Primary hypertension  Resume home medications     Renal calculus  - CT A/P showed delayed enhancement of the left kidney in left perinephric inflammation.  Findings suggest sequela of developing obstruction related to 1 cm calculus within the upper pole collecting system  - Concern for an infected stone     Plan:   - Urology consult   - See plan for pyelonephritis     Sepsis  This patient does have evidence of infective focus  My overall impression is sepsis. Vital signs were reviewed and noted in progress note.  Antibiotics given-   Antibiotics (From admission, onward)    None        Cultures were taken-   Microbiology Results (last 7 days)     Procedure Component Value Units Date/Time    Urine culture [[id number]] Collected: 10/25/22 1618    Order Status: Sent Specimen: Urine, Clean Catch Updated: 10/25/22 1720    Blood culture x two cultures. Draw prior to antibiotics. [[id number]] Collected: 10/25/22 1550    Order Status: Sent Specimen:  Blood from Peripheral, Antecubital, Left Updated: 10/25/22 1715    Blood culture x two cultures. Draw prior to antibiotics. [[id number]] Collected: 10/25/22 1614    Order Status: Sent Specimen: Blood from Peripheral, Antecubital, Right Updated: 10/25/22 1715        Latest lactate reviewed, they are-  No results for input(s): LACTATE in the last 72 hours.    Organ dysfunction indicated by N/A  Source- Urinary     Source control Achieved by- Antibiotics         VTE Risk Mitigation (From admission, onward)         Ordered     heparin (porcine) injection 5,000 Units  Every 8 hours         10/25/22 2026     IP VTE HIGH RISK PATIENT  Once         10/25/22 2026     Place sequential compression device  Until discontinued         10/25/22 2026                   Balaji Montes MD  Department of Hospital Medicine   [facility] [place] [facility]

## 2022-10-26 NOTE — SUBJECTIVE & OBJECTIVE
Past Medical History:   Diagnosis Date    Hypertension        No past surgical history on file.    Review of patient's allergies indicates:  No Known Allergies    Family History    None         Tobacco Use    Smoking status: Never    Smokeless tobacco: Not on file   Substance and Sexual Activity    Alcohol use: Not on file    Drug use: Not on file    Sexual activity: Not on file       Review of Systems   Constitutional:  Positive for fever.   HENT: Negative.     Eyes: Negative.    Respiratory:  Negative for cough, chest tightness and shortness of breath.    Cardiovascular:  Negative for chest pain.   Gastrointestinal:  Positive for abdominal pain. Negative for constipation, diarrhea and nausea.   Genitourinary:  Negative for flank pain.   Musculoskeletal: Negative.    Neurological: Negative.    Psychiatric/Behavioral: Negative.       Objective:     Temp:  [98.8 °F (37.1 °C)-103.4 °F (39.7 °C)] 103.4 °F (39.7 °C)  Pulse:  [103-133] 128  Resp:  [14-20] 14  SpO2:  [96 %-99 %] 96 %  BP: (127-168)/() 130/81     Body mass index is 51.24 kg/m².           Drains       None                   Physical Exam  Vitals and nursing note reviewed.   Constitutional:       Appearance: She is well-developed.   HENT:      Head: Normocephalic.   Eyes:      Conjunctiva/sclera: Conjunctivae normal.   Neck:      Thyroid: No thyromegaly.      Trachea: No tracheal deviation.   Cardiovascular:      Rate and Rhythm: Normal rate.      Pulses: Normal pulses.      Heart sounds: Normal heart sounds.   Pulmonary:      Effort: Pulmonary effort is normal. No respiratory distress.      Breath sounds: Normal breath sounds. No wheezing.   Abdominal:      General: There is no distension.      Palpations: Abdomen is soft. There is no mass.      Tenderness: There is no abdominal tenderness. There is no guarding or rebound.      Hernia: No hernia is present.   Musculoskeletal:         General: No tenderness. Normal range of motion.      Cervical back:  Normal range of motion.   Lymphadenopathy:      Cervical: No cervical adenopathy.   Skin:     General: Skin is warm and dry.      Findings: No erythema or rash.   Neurological:      Mental Status: She is alert and oriented to person, place, and time.   Psychiatric:         Behavior: Behavior normal.         Thought Content: Thought content normal.         Judgment: Judgment normal.       Significant Labs:    BMP:  Recent Labs   Lab 10/26/22  0542      K 3.7      CO2 21*   BUN 9   CREATININE 0.8   CALCIUM 8.6*       CBC:  Recent Labs   Lab 10/26/22  0542   WBC 12.07   HGB 9.6*   HCT 31.2*          Blood Culture:   Recent Labs   Lab 10/25/22  1550 10/25/22  1614   LABBLOO Gram stain waldemar bottle:  Gram negative rods  Results called to and read back by: GEORGE OCONNOR RN  10/26/2022  05:15AW Gram stain waldemar bottle: Gram negative rods  Results called to and read back by: GEORGE OCONNOR RN  10/26/2022  05:13AW     Urine Culture: No results for input(s): LABURIN in the last 168 hours.    Significant Imaging:  CT: I have reviewed all results within the past 24 hours and my personal findings are:  mild left perinephric stranding, stone noted in mid-pole.  Mild upper pole caliectasis.  No overt hydronephrosis.  Nephrograms are very similar.

## 2022-10-26 NOTE — CONSULTS
[facility]  Urology  Consult Note    Patient Name: Kriss Alvarez  MRN: [id number]  Admission Date: 10/25/2022  Hospital Length of Stay: 1   Code Status: Full Code   Attending Provider: Dominick Mejia MD   Consulting Provider: DANIEL Jim MD  Primary Care Physician: To Obtain Unable  Principal Problem:Sepsis    Inpatient consult to Urology  Consult performed by: ZAINA Jim MD  Consult ordered by: Balaji Montes MD          Subjective:     HPI:  Urolithiasis  Patient complains of left abdominal pain with radiation to the abdomen. Onset of symptoms was abrupt starting 1 day ago with rapidly improving course since that time. Patient describes the pain as aching and colicky,  intermittent  and rated as moderate. The patient has had no nausea/no vomiting and no diaphoresis. There has been fever and chills. The patient is not complaining of dysuria or frequency. Risk factors for urolithiasis: history of stone disease.       Past Medical History:   Diagnosis Date    Hypertension        No past surgical history on file.    Review of patient's allergies indicates:  No Known Allergies    Family History    None         Tobacco Use    Smoking status: Never    Smokeless tobacco: Not on file   Substance and Sexual Activity    Alcohol use: Not on file    Drug use: Not on file    Sexual activity: Not on file       Review of Systems   Constitutional:  Positive for fever.   HENT: Negative.     Eyes: Negative.    Respiratory:  Negative for cough, chest tightness and shortness of breath.    Cardiovascular:  Negative for chest pain.   Gastrointestinal:  Positive for abdominal pain. Negative for constipation, diarrhea and nausea.   Genitourinary:  Negative for flank pain.   Musculoskeletal: Negative.    Neurological: Negative.    Psychiatric/Behavioral: Negative.       Objective:     Temp:  [98.8 °F (37.1 °C)-103.4 °F (39.7 °C)] 103.4 °F (39.7 °C)  Pulse:  [103-133] 128  Resp:  [14-20] 14  SpO2:  [96 %-99 %] 96  %  BP: (127-168)/() 130/81     Body mass index is 51.24 kg/m².           Drains       None                   Physical Exam  Vitals and nursing note reviewed.   Constitutional:       Appearance: She is well-developed.   HENT:      Head: Normocephalic.   Eyes:      Conjunctiva/sclera: Conjunctivae normal.   Neck:      Thyroid: No thyromegaly.      Trachea: No tracheal deviation.   Cardiovascular:      Rate and Rhythm: Normal rate.      Pulses: Normal pulses.      Heart sounds: Normal heart sounds.   Pulmonary:      Effort: Pulmonary effort is normal. No respiratory distress.      Breath sounds: Normal breath sounds. No wheezing.   Abdominal:      General: There is no distension.      Palpations: Abdomen is soft. There is no mass.      Tenderness: There is no abdominal tenderness. There is no guarding or rebound.      Hernia: No hernia is present.   Musculoskeletal:         General: No tenderness. Normal range of motion.      Cervical back: Normal range of motion.   Lymphadenopathy:      Cervical: No cervical adenopathy.   Skin:     General: Skin is warm and dry.      Findings: No erythema or rash.   Neurological:      Mental Status: She is alert and oriented to person, place, and time.   Psychiatric:         Behavior: Behavior normal.         Thought Content: Thought content normal.         Judgment: Judgment normal.       Significant Labs:    BMP:  Recent Labs   Lab 10/26/22  0542      K 3.7      CO2 21*   BUN 9   CREATININE 0.8   CALCIUM 8.6*       CBC:  Recent Labs   Lab 10/26/22  0542   WBC 12.07   HGB 9.6*   HCT 31.2*          Blood Culture:   Recent Labs   Lab 10/25/22  1550 10/25/22  1614   LABBLOO Gram stain waldemar bottle:  Gram negative rods  Results called to and read back by: GEORGE OCONNOR RN  10/26/2022  05:15AW Gram stain waldemar bottle: Gram negative rods  Results called to and read back by: GEOGRE OCONNOR RN  10/26/2022  05:13AW     Urine Culture: No results for input(s): LABURIN in  the last 168 hours.    Significant Imaging:  CT: I have reviewed all results within the past 24 hours and my personal findings are:  mild left perinephric stranding, stone noted in mid-pole.  Mild upper pole caliectasis.  No overt hydronephrosis.  Nephrograms are very similar.                      Assessment and Plan:     Renal calculus  Non-obstructing  Monitor    Pyelonephritis  Follow culture  Treat x 2 weeks        VTE Risk Mitigation (From admission, onward)         Ordered     heparin (porcine) injection 5,000 Units  Every 8 hours         10/25/22 2026     IP VTE HIGH RISK PATIENT  Once         10/25/22 2026     Place sequential compression device  Until discontinued         10/25/22 2026                Thank you for your consult. I will sign off. Please contact us if you have any additional questions.    DANIEL Jmi MD  Urology  [facility]

## 2022-10-26 NOTE — SUBJECTIVE & OBJECTIVE
NAEON. Denies SOB or CP.   No complaints       Review of Systems   Constitutional: Negative.    HENT: Negative.     Eyes: Negative.    Respiratory: Negative.     Cardiovascular: Negative.    Gastrointestinal:  Positive for abdominal pain.   Endocrine: Negative.    Genitourinary: Negative.    Musculoskeletal: Negative.    Skin: Negative.    Allergic/Immunologic: Negative.    Neurological: Negative.    Psychiatric/Behavioral: Negative.     Objective:     Vital Signs (Most Recent):  Temp: 98.8 °F (37.1 °C) (10/26/22 0510)  Pulse: (!) 113 (10/26/22 0510)  Resp: 19 (10/26/22 0510)  BP: 133/74 (10/26/22 0510)  SpO2: 99 % (10/26/22 0510)   Vital Signs (24h Range):  Temp:  [98.8 °F (37.1 °C)-103 °F (39.4 °C)] 98.8 °F (37.1 °C)  Pulse:  [103-133] 113  Resp:  [18-20] 19  SpO2:  [97 %-99 %] 99 %  BP: (127-168)/() 133/74     Weight: (!) 152.9 kg (337 lb)  Body mass index is 51.24 kg/m².    Physical Exam  Vitals and nursing note reviewed.   Constitutional:       General: She is not in acute distress.     Appearance: Normal appearance. She is not ill-appearing.   HENT:      Head: Normocephalic and atraumatic.      Nose: Nose normal.      Mouth/Throat:      Mouth: Mucous membranes are moist.   Eyes:      Extraocular Movements: Extraocular movements intact.   Cardiovascular:      Rate and Rhythm: Normal rate.      Pulses: Normal pulses.      Heart sounds: No murmur heard.  Pulmonary:      Effort: Pulmonary effort is normal. No respiratory distress.   Abdominal:      General: Abdomen is flat.      Palpations: Abdomen is soft.      Tenderness: There is abdominal tenderness.      Comments: Epigastric tenderness    Musculoskeletal:      Right lower leg: No edema.      Left lower leg: No edema.   Skin:     General: Skin is warm.      Capillary Refill: Capillary refill takes less than 2 seconds.   Neurological:      General: No focal deficit present.      Mental Status: She is alert.   Psychiatric:         Mood and Affect: Mood  normal.               Recent Results (from the past 24 hour(s))   POCT URINALYSIS W/O SCOPE    Collection Time: 10/25/22  3:45 PM   Result Value Ref Range    Glucose, UA Negative     Bilirubin, UA 1+ (A)     Ketones, UA Trace (A)     Spec Grav UA 1.020     Blood, UA 3+ (A)     PH, UA 7.0     Protein, UA 3+ (A)     Urobilinogen, UA 4.0 (A) E.U./dL    Nitrite, UA Positive (P)     Leukocytes, UA 1+ (A)     Color, UA Yellow     Clarity, UA Clear    Blood culture x two cultures. Draw prior to antibiotics.    Collection Time: 10/25/22  3:50 PM    Specimen: Peripheral, Antecubital, Left; Blood   Result Value Ref Range    Blood Culture, Routine Gram stain waldemar bottle:     Blood Culture, Routine Gram negative rods     Blood Culture, Routine       Results called to and read back by: GEORGE OCONNOR RN  10/26/2022  05:15AW   POCT urine pregnancy    Collection Time: 10/25/22  3:51 PM   Result Value Ref Range    POC Preg Test, Ur Negative Negative     Acceptable Yes    POCT CMP    Collection Time: 10/25/22  4:00 PM   Result Value Ref Range    Albumin, POC 3.6 3.3 - 5.5 g/dL    Alkaline Phosphatase, POC 90 42 - 141 U/L    ALT (SGPT), POC 21 10 - 47 U/L    AST (SGOT), POC 19 11 - 38 U/L    POC BUN 10 7 - 22 mg/dL    Calcium, POC 10.1 8.0 - 10.3 mg/dL    POC Chloride 100 98 - 108 mmol/L    POC Creatinine 0.8 0.6 - 1.2 mg/dL    POC Glucose 131 (H) 73 - 118 mg/dL    POC Potassium 3.4 (L) 3.6 - 5.1 mmol/L    POC Sodium 138 128 - 145 mmol/L    Bilirubin, POC 1.1 0.2 - 1.6 mg/dL    POC TCO2 24 18 - 33 mmol/L    Protein, POC 8.4 (H) 6.4 - 8.1 g/dL   POCT Rapid Influenza A/B    Collection Time: 10/25/22  4:01 PM   Result Value Ref Range    Influenza B Ag negative Positive/Negative    Inflenza A Ag negative Positive/Negative   Blood culture x two cultures. Draw prior to antibiotics.    Collection Time: 10/25/22  4:14 PM    Specimen: Peripheral, Antecubital, Right; Blood   Result Value Ref Range    Blood Culture, Routine Gram  stain waldemar bottle: Gram negative rods     Blood Culture, Routine       Results called to and read back by: GEORGE OCONNOR RN  10/26/2022  05:13AW   POCT Rapid Strep A    Collection Time: 10/25/22  4:43 PM   Result Value Ref Range    POC Rapid Strep A negative Positive/Negative   POCT COVID-19 Rapid Screening    Collection Time: 10/25/22  4:52 PM   Result Value Ref Range    POC Rapid COVID Negative Negative     Acceptable Yes    ISTAT PROCEDURE    Collection Time: 10/25/22  5:05 PM   Result Value Ref Range    POC PH 7.354 7.35 - 7.45    POC PCO2 45.2 (H) 35 - 45 mmHg    POC PO2 163 (HH) 40 - 60 mmHg    POC HCO3 25.2 24 - 28 mmol/L    POC BE -1 -2 to 2 mmol/L    POC SATURATED O2 99 95 - 100 %    POC Lactate 2.34 (H) 0.5 - 2.2 mmol/L    POC TCO2 27 24 - 29 mmol/L    Sample VENOUS     Site Other     Allens Test N/A    ISTAT PROCEDURE    Collection Time: 10/25/22  6:39 PM   Result Value Ref Range    POC PH 7.421 7.35 - 7.45    POC PCO2 35.9 35 - 45 mmHg    POC PO2 50 40 - 60 mmHg    POC HCO3 23.3 (L) 24 - 28 mmol/L    POC BE -1 -2 to 2 mmol/L    POC SATURATED O2 86 (L) 95 - 100 %    POC Lactate 0.74 0.5 - 2.2 mmol/L    POC TCO2 24 24 - 29 mmol/L    Sample VENOUS     Site Other     Allens Test N/A    Phosphorus    Collection Time: 10/26/22  5:42 AM   Result Value Ref Range    Phosphorus 2.6 (L) 2.7 - 4.5 mg/dL   Magnesium    Collection Time: 10/26/22  5:42 AM   Result Value Ref Range    Magnesium 1.6 1.6 - 2.6 mg/dL   Basic Metabolic Panel    Collection Time: 10/26/22  5:42 AM   Result Value Ref Range    Sodium 136 136 - 145 mmol/L    Potassium 3.7 3.5 - 5.1 mmol/L    Chloride 104 95 - 110 mmol/L    CO2 21 (L) 23 - 29 mmol/L    Glucose 130 (H) 70 - 110 mg/dL    BUN 9 6 - 20 mg/dL    Creatinine 0.8 0.5 - 1.4 mg/dL    Calcium 8.6 (L) 8.7 - 10.5 mg/dL    Anion Gap 11 8 - 16 mmol/L    eGFR >60 >60 mL/min/1.73 m^2   CBC Auto Differential    Collection Time: 10/26/22  5:42 AM   Result Value Ref Range    WBC 12.07  3.90 - 12.70 K/uL    RBC 3.30 (L) 4.00 - 5.40 M/uL    Hemoglobin 9.6 (L) 12.0 - 16.0 g/dL    Hematocrit 31.2 (L) 37.0 - 48.5 %    MCV 95 82 - 98 fL    MCH 29.1 27.0 - 31.0 pg    MCHC 30.8 (L) 32.0 - 36.0 g/dL    RDW 14.7 (H) 11.5 - 14.5 %    Platelets 243 150 - 450 K/uL    MPV 9.2 9.2 - 12.9 fL    Immature Granulocytes 0.6 (H) 0.0 - 0.5 %    Gran # (ANC) 10.1 (H) 1.8 - 7.7 K/uL    Immature Grans (Abs) 0.07 (H) 0.00 - 0.04 K/uL    Lymph # 0.8 (L) 1.0 - 4.8 K/uL    Mono # 1.0 0.3 - 1.0 K/uL    Eos # 0.0 0.0 - 0.5 K/uL    Baso # 0.02 0.00 - 0.20 K/uL    nRBC 0 0 /100 WBC    Gran % 83.8 (H) 38.0 - 73.0 %    Lymph % 7.0 (L) 18.0 - 48.0 %    Mono % 8.2 4.0 - 15.0 %    Eosinophil % 0.2 0.0 - 8.0 %    Basophil % 0.2 0.0 - 1.9 %    Differential Method Automated        Microbiology Results (last 7 days)       Procedure Component Value Units Date/Time    Blood culture x two cultures. Draw prior to antibiotics. [[id number]] Collected: 10/25/22 1550    Order Status: Completed Specimen: Blood from Peripheral, Antecubital, Left Updated: 10/26/22 0515     Blood Culture, Routine Gram stain waldemar bottle:      Gram negative rods      Results called to and read back by: GEORGE OCONNOR RN  10/26/2022  05:15AW    Narrative:      Aerobic and anaerobic    Blood culture x two cultures. Draw prior to antibiotics. [[id number]] Collected: 10/25/22 1614    Order Status: Completed Specimen: Blood from Peripheral, Antecubital, Right Updated: 10/26/22 0514     Blood Culture, Routine Gram stain waldemar bottle: Gram negative rods      Results called to and read back by: GEORGE OCONNOR RN  10/26/2022  05:13AW    Narrative:      Aerobic and anaerobic    Urine culture [[id number]] Collected: 10/25/22 1618    Order Status: Sent Specimen: Urine, Clean Catch Updated: 10/25/22 1720             Imaging Results               CT Abdomen Pelvis With Contrast (Final result)  Result time 10/25/22 17:21:53      Final result by Dario Riggs MD (10/25/22 17:21:53)                    Impression:      This report was flagged in Epic as abnormal.    1. Delayed enhancement of the left kidney in left perinephric inflammation.  Findings suggest sequela of developing obstruction related to 1 cm calculus within the upper pole collecting system.  Correlation is advised as superimposed infection not excluded.  Differential for this finding would include sequela of recently passed calculus given additional left nonobstructive nephrolithiasis.  Correlation is advised.  2. Possible hepatic steatosis, correlation with LFTs recommended.  3. Cholelithiasis without secondary findings to suggest acute cholecystitis.  4. Additional findings above.      Electronically signed by: Dario Riggs MD  Date:    10/25/2022  Time:    17:21               Narrative:    EXAMINATION:  CT ABDOMEN PELVIS WITH CONTRAST    CLINICAL HISTORY:  Abdominal pain, acute, nonlocalized;    TECHNIQUE:  Low dose axial images, sagittal and coronal reformations were obtained from the lung bases to the pubic symphysis following the IV administration of 100 mL of Omnipaque 350 no    COMPARISON:  None.    FINDINGS:  Images of the lower thorax are remarkable for minimal dependent atelectasis.    The liver is mildly hypoattenuating, possibly reflecting steatosis versus sequela of contrast phase.  Correlation with LFTs as warranted.  The spleen, pancreas and adrenal glands are unremarkable.  There is cholelithiasis without secondary findings to suggest acute cholecystitis.  The portal vein, splenic vein, SMV, celiac axis and SMA all are patent.  No significant abdominal lymphadenopathy.    The kidneys enhance asymmetrically noting delayed enhancement of the left kidney as compared to the right.  There is no right hydronephrosis or right nephrolithiasis.  The right ureter is unremarkable without calculi seen.  There is left perinephric and periureteral inflammation.  There is a prominent calculus within the central left renal collecting  system measuring 1 cm.  There is additional punctate left nonobstructive nephrolithiasis.  The left ureter is unable to be followed in its entirety to the urinary bladder, no definite calculi seen along its course.  The urinary bladder is unremarkable.  The uterus and adnexa are unremarkable.    There are a few scattered colonic diverticula without inflammation.  The terminal ileum and appendix are unremarkable.  The small bowel is grossly unremarkable.  No focal organized pelvic fluid collection.  There are a few scattered shotty periaortic and paracaval lymph nodes.    There are degenerative changes of the bilateral sacroiliac joints, pubic symphysis, and spine.  No significant inguinal lymphadenopathy.                                       X-Ray Chest AP Portable (Final result)  Result time 10/25/22 16:32:28      Final result by Vicki Dillon MD (10/25/22 16:32:28)                   Impression:      No acute intrathoracic process seen.      Electronically signed by: Vicki Dillon MD  Date:    10/25/2022  Time:    16:32               Narrative:    EXAMINATION:  XR CHEST AP PORTABLE    CLINICAL HISTORY:  Sepsis;    TECHNIQUE:  Single frontal view of the chest was performed.    COMPARISON:  None    FINDINGS:  The cardiac silhouette is normal in size.  The pulmonary vascularity is normal.  The lungs are well inflated and clear.  No focal airspace disease, no pleural effusion, no pneumothorax.

## 2022-10-26 NOTE — PLAN OF CARE
Chart check complete, pt AAOx4, able to verbalize needs.  IV infusing LR bolus as ordered.  PRN tylenol given for elevated temperature of 102F.  Pt able to ambulate to the restroom and void without difficulty, no pain reported.  Diet tolerated well, no N/V noted.  Skin intact.  No acute distress noted, pt free from falls or injury this shift.  Bed in low position, wheels locked, call light in reach for assistance, will continue to monitor.        Problem: Adult Inpatient Plan of Care  Goal: Plan of Care Review  Outcome: Ongoing, Progressing     Problem: Adult Inpatient Plan of Care  Goal: Patient-Specific Goal (Individualized)  Outcome: Ongoing, Progressing     Problem: Adult Inpatient Plan of Care  Goal: Absence of Hospital-Acquired Illness or Injury  Outcome: Ongoing, Progressing     Problem: Adult Inpatient Plan of Care  Goal: Optimal Comfort and Wellbeing  Outcome: Ongoing, Progressing     Problem: Bariatric Environmental Safety  Goal: Safety Maintained with Care  Outcome: Ongoing, Progressing     Problem: Glycemic Control Impaired (Sepsis/Septic Shock)  Goal: Blood Glucose Level Within Desired Range  Outcome: Ongoing, Progressing     Problem: Nutrition Impaired (Sepsis/Septic Shock)  Goal: Optimal Nutrition Intake  Outcome: Ongoing, Progressing

## 2022-10-26 NOTE — PLAN OF CARE
[place] - Med Surg  Initial Discharge Assessment      Patient independent from home and lives with children. Spoke with patient at bedside,stated her mother and sig other will be her help at home. Per physician, repeat blood cultures pending and Urology consulted. [place] to continue to follow for dc needs        [facility]     Admission Diagnosis: Pyelonephritis [N12]  Tachycardia [R00.0]  Chest pain [R07.9]    Admission Date: 10/25/2022  Expected Discharge Date: 10/29/2022    Discharge Barriers Identified: None    Payor: [employer]ARE / Plan: [employer]PLETE / Product Type: Medicare Advantage /     No emergency contact information on file.    Discharge Plan A: Home  Discharge Plan B: Home with family      [id number] [address] EX[address] AT [facility] & [address] [address]  Phone: [phone] Fax: [phone]      Initial Assessment (most recent)       Adult Discharge Assessment - 10/26/22 1212          Discharge Assessment    Assessment Type Discharge Planning Assessment     Confirmed/corrected address, phone number and insurance Yes     Confirmed Demographics Correct on Facesheet     Source of Information patient     When was your last doctors appointment? --   unknown    Does patient/caregiver understand observation status No     Was observation education provided? No     Communicated ARIEL with patient/caregiver Yes     Reason For Admission Sepsis     Lives With child(tariq), dependent     Facility Arrived From: Home     Do you expect to return to your current living situation? Yes     Do you have help at home or someone to help you manage your care at home? Yes     Who are your caregiver(s) and their phone number(s)? Esther Honeycutt (mother) [phone]     Prior to hospitilization cognitive status: Alert/Oriented     Current cognitive status: Alert/Oriented     Walking or  Climbing Stairs Difficulty none     Dressing/Bathing Difficulty none     Equipment Currently Used at Home other (see comments)   BP monitor    Readmission within 30 days? No     Patient currently being followed by outpatient case management? No     Do you currently have service(s) that help you manage your care at home? No     Do you take prescription medications? Yes     Do you have prescription coverage? Yes     Coverage PHN     Do you have any problems affording any of your prescribed medications? No     Is the patient taking medications as prescribed? yes     Who is going to help you get home at discharge? Esther Honeycutt     How do you get to doctors appointments? car, drives self;family or friend will provide     Are you on dialysis? No     Do you take coumadin? No     Discharge Plan A Home     Discharge Plan B Home with family     DME Needed Upon Discharge  other (see comments)   TBD    Discharge Plan discussed with: Patient     Discharge Barriers Identified None        Physical Activity    On average, how many days per week do you engage in moderate to strenuous exercise (like a brisk walk)? 0 days     On average, how many minutes do you engage in exercise at this level? 0 min        Financial Resource Strain    How hard is it for you to pay for the very basics like food, housing, medical care, and heating? Not hard at all        Housing Stability    In the last 12 months, was there a time when you were not able to pay the mortgage or rent on time? No     In the last 12 months, how many places have you lived? 1     In the last 12 months, was there a time when you did not have a steady place to sleep or slept in a shelter (including now)? No        Transportation Needs    In the past 12 months, has lack of transportation kept you from medical appointments or from getting medications? No     In the past 12 months, has lack of transportation kept you from meetings, work, or from getting things needed for daily  living? No        Food Insecurity    Within the past 12 months, you worried that your food would run out before you got the money to buy more. Never true     Within the past 12 months, the food you bought just didn't last and you didn't have money to get more. Never true        Stress    Do you feel stress - tense, restless, nervous, or anxious, or unable to sleep at night because your mind is troubled all the time - these days? Only a little        Social Connections    In a typical week, how many times do you talk on the phone with family, friends, or neighbors? More than three times a week     How often do you get together with friends or relatives? Once a week     How often do you attend Mu-ism or Taoism services? Never     Do you belong to any clubs or organizations such as Mu-ism groups, unions, fraternal or athletic groups, or school groups? No     How often do you attend meetings of the clubs or organizations you belong to? Never     Are you , , , , never , or living with a partner? Never         Alcohol Use    Q1: How often do you have a drink containing alcohol? Never     Q2: How many drinks containing alcohol do you have on a typical day when you are drinking? Patient does not drink     Q3: How often do you have six or more drinks on one occasion? Never

## 2022-10-26 NOTE — ASSESSMENT & PLAN NOTE
- Presented with abdominal pain, nausea and diarrhea.   - Septic on arrival, febrile (39.4), tachycardic (120s). Labs showed leukocytosis (18.0).   - UA showed +1 leukocytes, positive nitrites and +3 blood.    - CT A/P showed delayed enhancement of the left kidney in left perinephric inflammation.  Findings suggest sequela of developing obstruction related to 1 cm calculus within the upper pole collecting system.      Plan:   - Continue ceftriaxone   - F/U cultures and de-escalate

## 2022-10-26 NOTE — CARE UPDATE
[facility] MEWS TRIGGER FOLLOW UP       MEWS Monitoring, Score is: 6  Indication for review: Temp, and elevated HR    Bedside Nurse, Marcella contacted, MD aware/ following, instructed to call [phone] for further concerns or assistance.  Pt seen at bedside where she was in no distress talking on her phone.  Bedside nurse Marcella was to give Tylenol for fever.

## 2022-10-26 NOTE — ASSESSMENT & PLAN NOTE
This patient does have evidence of infective focus  My overall impression is sepsis. Vital signs were reviewed and noted in progress note.  Antibiotics given-   Antibiotics (From admission, onward)    None        Cultures were taken-   Microbiology Results (last 7 days)     Procedure Component Value Units Date/Time    Urine culture [[id number]] Collected: 10/25/22 1618    Order Status: Sent Specimen: Urine, Clean Catch Updated: 10/25/22 1720    Blood culture x two cultures. Draw prior to antibiotics. [[id number]] Collected: 10/25/22 1550    Order Status: Sent Specimen: Blood from Peripheral, Antecubital, Left Updated: 10/25/22 1715    Blood culture x two cultures. Draw prior to antibiotics. [[id number]] Collected: 10/25/22 1614    Order Status: Sent Specimen: Blood from Peripheral, Antecubital, Right Updated: 10/25/22 1715        Latest lactate reviewed, they are-  No results for input(s): LACTATE in the last 72 hours.    Organ dysfunction indicated by N/A  Source- Urinary     Source control Achieved by- Antibiotics

## 2022-10-26 NOTE — HPI
This is a 36 year old female with a PMHx of morbid obesity, HTN who presents with abdominal pain and fevers.     Patient reports developing epigastric abdominal pain that started 1 day prior to presentation. She had no relieving or aggravating factors and the pain was not radiating. She denied having any associated symptoms including nausea, vomiting, diarrhea, constipation but did have chills at home. Her children are sick with URI symptoms. She denies having any dysuria, urinary frequency or SOB. In the ED, she was febrile (39.4), tachycardic (120s), otherwise stable. Labs showed leukocytosis (18.0). UA showed +1 leukocytes, positive nitrites and +3 blood.  CXR was negative. CT A/P showed delayed enhancement of the left kidney in left perinephric inflammation.  Findings suggest sequela of developing obstruction related to 1 cm calculus within the upper pole collecting system.  She was given fluids, and ceftriaxone. The patient was admitted for further management.

## 2022-10-26 NOTE — SUBJECTIVE & OBJECTIVE
Past Medical History:   Diagnosis Date    Hypertension        No past surgical history on file.    Review of patient's allergies indicates:  No Known Allergies    No current facility-administered medications on file prior to encounter.     Current Outpatient Medications on File Prior to Encounter   Medication Sig    labetaloL (NORMODYNE) 200 MG tablet Take 200 mg by mouth 2 (two) times daily.    prenatal 25/iron fum/folic/dha (PRENATAL-1 ORAL) Take by mouth.     Family History    None       Tobacco Use    Smoking status: Never    Smokeless tobacco: Not on file   Substance and Sexual Activity    Alcohol use: Not on file    Drug use: Not on file    Sexual activity: Not on file     Review of Systems   Constitutional: Negative.    HENT: Negative.     Eyes: Negative.    Respiratory: Negative.     Cardiovascular: Negative.    Gastrointestinal:  Positive for abdominal pain.   Endocrine: Negative.    Genitourinary: Negative.    Musculoskeletal: Negative.    Skin: Negative.    Allergic/Immunologic: Negative.    Neurological: Negative.    Psychiatric/Behavioral: Negative.     Objective:     Vital Signs (Most Recent):  Temp: 98.9 °F (37.2 °C) (10/25/22 2121)  Pulse: 103 (10/25/22 2121)  Resp: 20 (10/25/22 2121)  BP: (!) 160/92 (10/25/22 2121)  SpO2: 99 % (10/25/22 2121)   Vital Signs (24h Range):  Temp:  [98.9 °F (37.2 °C)-103 °F (39.4 °C)] 98.9 °F (37.2 °C)  Pulse:  [103-133] 103  Resp:  [18-20] 20  SpO2:  [97 %-99 %] 99 %  BP: (129-168)/() 160/92     Weight: (!) 152.9 kg (337 lb)  Body mass index is 51.24 kg/m².    Physical Exam  Vitals and nursing note reviewed.   Constitutional:       General: She is not in acute distress.     Appearance: Normal appearance. She is not ill-appearing.   HENT:      Head: Normocephalic and atraumatic.      Nose: Nose normal.      Mouth/Throat:      Mouth: Mucous membranes are moist.   Eyes:      Extraocular Movements: Extraocular movements intact.   Cardiovascular:      Rate and Rhythm:  Normal rate.      Pulses: Normal pulses.      Heart sounds: No murmur heard.  Pulmonary:      Effort: Pulmonary effort is normal. No respiratory distress.   Abdominal:      General: Abdomen is flat.      Palpations: Abdomen is soft.      Tenderness: There is abdominal tenderness.      Comments: Epigastric tenderness    Musculoskeletal:      Right lower leg: No edema.      Left lower leg: No edema.   Skin:     General: Skin is warm.      Capillary Refill: Capillary refill takes less than 2 seconds.   Neurological:      General: No focal deficit present.      Mental Status: She is alert.   Psychiatric:         Mood and Affect: Mood normal.           Significant Labs: All pertinent labs within the past 24 hours have been reviewed.    Significant Imaging: I have reviewed all pertinent imaging results/findings within the past 24 hours.

## 2022-10-27 LAB — BACTERIA UR CULT: ABNORMAL

## 2022-10-27 PROCEDURE — 25000003 PHARM REV CODE 250: Performed by: STUDENT IN AN ORGANIZED HEALTH CARE EDUCATION/TRAINING PROGRAM

## 2022-10-27 PROCEDURE — 99900035 HC TECH TIME PER 15 MIN (STAT)

## 2022-10-27 PROCEDURE — 11000001 HC ACUTE MED/SURG PRIVATE ROOM

## 2022-10-27 PROCEDURE — 63600175 PHARM REV CODE 636 W HCPCS: Performed by: STUDENT IN AN ORGANIZED HEALTH CARE EDUCATION/TRAINING PROGRAM

## 2022-10-27 RX ADMIN — ACETAMINOPHEN 1000 MG: 500 TABLET ORAL at 09:10

## 2022-10-27 RX ADMIN — ACETAMINOPHEN 1000 MG: 500 TABLET ORAL at 03:10

## 2022-10-27 RX ADMIN — ACETAMINOPHEN 1000 MG: 500 TABLET ORAL at 06:10

## 2022-10-27 RX ADMIN — LABETALOL HYDROCHLORIDE 200 MG: 100 TABLET, FILM COATED ORAL at 09:10

## 2022-10-27 RX ADMIN — CEFTRIAXONE SODIUM 2 G: 2 INJECTION, POWDER, FOR SOLUTION INTRAMUSCULAR; INTRAVENOUS at 06:10

## 2022-10-27 RX ADMIN — LABETALOL HYDROCHLORIDE 200 MG: 100 TABLET, FILM COATED ORAL at 08:10

## 2022-10-27 NOTE — PLAN OF CARE
Problem: Adult Inpatient Plan of Care  Goal: Plan of Care Review  Outcome: Ongoing, Progressing  Flowsheets (Taken 10/27/2022 0447)  Plan of Care Reviewed With: patient    Patient remains free from injury and falls. Remains afebrile this shift. Resting comfortably. Plan of care continued.

## 2022-10-27 NOTE — SUBJECTIVE & OBJECTIVE
NAEON. Denies SOB or CP.   No complaints       Review of Systems   Constitutional: Negative.    HENT: Negative.     Eyes: Negative.    Respiratory: Negative.     Cardiovascular: Negative.    Gastrointestinal:  Positive for abdominal pain.   Endocrine: Negative.    Genitourinary: Negative.    Musculoskeletal: Negative.    Skin: Negative.    Allergic/Immunologic: Negative.    Neurological: Negative.    Psychiatric/Behavioral: Negative.         Objective:     Vital Signs (Most Recent):  Temp: 99 °F (37.2 °C) (10/27/22 0756)  Pulse: 97 (10/27/22 0756)  Resp: 15 (10/27/22 0756)  BP: (!) 143/88 (10/27/22 0758)  SpO2: 97 % (10/27/22 0756)   Vital Signs (24h Range):  Temp:  [97.9 °F (36.6 °C)-102.8 °F (39.3 °C)] 99 °F (37.2 °C)  Pulse:  [] 97  Resp:  [14-20] 15  SpO2:  [97 %-100 %] 97 %  BP: (107-151)/(67-92) 143/88     Weight: (!) 152.9 kg (337 lb)  Body mass index is 51.24 kg/m².    Physical Exam  Vitals and nursing note reviewed.   Constitutional:       General: She is not in acute distress.     Appearance: Normal appearance. She is not ill-appearing.   HENT:      Head: Normocephalic and atraumatic.      Nose: Nose normal.      Mouth/Throat:      Mouth: Mucous membranes are moist.   Eyes:      Extraocular Movements: Extraocular movements intact.   Cardiovascular:      Rate and Rhythm: Normal rate.      Pulses: Normal pulses.      Heart sounds: No murmur heard.  Pulmonary:      Effort: Pulmonary effort is normal. No respiratory distress.   Abdominal:      General: Abdomen is flat.      Palpations: Abdomen is soft.      Tenderness: There is abdominal tenderness.      Comments: Epigastric tenderness    Musculoskeletal:      Right lower leg: No edema.      Left lower leg: No edema.   Skin:     General: Skin is warm.      Capillary Refill: Capillary refill takes less than 2 seconds.   Neurological:      General: No focal deficit present.      Mental Status: She is alert.   Psychiatric:         Mood and Affect: Mood  normal.               No results found for this or any previous visit (from the past 24 hour(s)).      Microbiology Results (last 7 days)       Procedure Component Value Units Date/Time    Blood culture x two cultures. Draw prior to antibiotics. [[id number]]  (Abnormal) Collected: 10/25/22 1614    Order Status: Completed Specimen: Blood from Peripheral, Antecubital, Right Updated: 10/27/22 0854     Blood Culture, Routine Gram stain waldemar bottle: Gram negative rods      Results called to and read back by: GEORGE OCONNOR RN  10/26/2022  05:13AW      GRAM NEGATIVE PRIYANK    Narrative:      Aerobic and anaerobic    Blood culture x two cultures. Draw prior to antibiotics. [[id number]]  (Abnormal) Collected: 10/25/22 1550    Order Status: Completed Specimen: Blood from Peripheral, Antecubital, Left Updated: 10/27/22 0852     Blood Culture, Routine Gram stain waldemar bottle:      Gram negative rods      Results called to and read back by: GEORGE OCONNOR RN  10/26/2022  05:15AW      Gram stain aer bottle: Gram negative rods      Positive results previously called 10/26/2022  15:05      GRAM NEGATIVE PRIYANK  Identification and susceptibility pending      Narrative:      Aerobic and anaerobic    Urine culture [[id number]]  (Abnormal)  (Susceptibility) Collected: 10/25/22 1618    Order Status: Completed Specimen: Urine, Clean Catch Updated: 10/27/22 0719     Urine Culture, Routine ESCHERICHIA COLI  >100,000 cfu/ml      Narrative:      Indicated criteria for high risk culture:->Other  Other (specify):->protocol    Blood culture [[id number]] Collected: 10/26/22 0853    Order Status: Completed Specimen: Blood from Peripheral, Right Hand Updated: 10/26/22 1712     Blood Culture, Routine No Growth to date    Blood culture [[id number]] Collected: 10/26/22 0853    Order Status: Completed Specimen: Blood from Peripheral, Right Wrist Updated: 10/26/22 1712     Blood Culture, Routine No Growth to date             Imaging Results               CT Abdomen  Pelvis With Contrast (Final result)  Result time 10/25/22 17:21:53      Final result by Dario Riggs MD (10/25/22 17:21:53)                   Impression:      This report was flagged in Epic as abnormal.    1. Delayed enhancement of the left kidney in left perinephric inflammation.  Findings suggest sequela of developing obstruction related to 1 cm calculus within the upper pole collecting system.  Correlation is advised as superimposed infection not excluded.  Differential for this finding would include sequela of recently passed calculus given additional left nonobstructive nephrolithiasis.  Correlation is advised.  2. Possible hepatic steatosis, correlation with LFTs recommended.  3. Cholelithiasis without secondary findings to suggest acute cholecystitis.  4. Additional findings above.      Electronically signed by: Dario Riggs MD  Date:    10/25/2022  Time:    17:21               Narrative:    EXAMINATION:  CT ABDOMEN PELVIS WITH CONTRAST    CLINICAL HISTORY:  Abdominal pain, acute, nonlocalized;    TECHNIQUE:  Low dose axial images, sagittal and coronal reformations were obtained from the lung bases to the pubic symphysis following the IV administration of 100 mL of Omnipaque 350 no    COMPARISON:  None.    FINDINGS:  Images of the lower thorax are remarkable for minimal dependent atelectasis.    The liver is mildly hypoattenuating, possibly reflecting steatosis versus sequela of contrast phase.  Correlation with LFTs as warranted.  The spleen, pancreas and adrenal glands are unremarkable.  There is cholelithiasis without secondary findings to suggest acute cholecystitis.  The portal vein, splenic vein, SMV, celiac axis and SMA all are patent.  No significant abdominal lymphadenopathy.    The kidneys enhance asymmetrically noting delayed enhancement of the left kidney as compared to the right.  There is no right hydronephrosis or right nephrolithiasis.  The right ureter is unremarkable without  calculi seen.  There is left perinephric and periureteral inflammation.  There is a prominent calculus within the central left renal collecting system measuring 1 cm.  There is additional punctate left nonobstructive nephrolithiasis.  The left ureter is unable to be followed in its entirety to the urinary bladder, no definite calculi seen along its course.  The urinary bladder is unremarkable.  The uterus and adnexa are unremarkable.    There are a few scattered colonic diverticula without inflammation.  The terminal ileum and appendix are unremarkable.  The small bowel is grossly unremarkable.  No focal organized pelvic fluid collection.  There are a few scattered shotty periaortic and paracaval lymph nodes.    There are degenerative changes of the bilateral sacroiliac joints, pubic symphysis, and spine.  No significant inguinal lymphadenopathy.                                       X-Ray Chest AP Portable (Final result)  Result time 10/25/22 16:32:28      Final result by Vicki Dillon MD (10/25/22 16:32:28)                   Impression:      No acute intrathoracic process seen.      Electronically signed by: Vicki Dillon MD  Date:    10/25/2022  Time:    16:32               Narrative:    EXAMINATION:  XR CHEST AP PORTABLE    CLINICAL HISTORY:  Sepsis;    TECHNIQUE:  Single frontal view of the chest was performed.    COMPARISON:  None    FINDINGS:  The cardiac silhouette is normal in size.  The pulmonary vascularity is normal.  The lungs are well inflated and clear.  No focal airspace disease, no pleural effusion, no pneumothorax.

## 2022-10-27 NOTE — ASSESSMENT & PLAN NOTE
GNR bacteremia  E.coli urosepsis/bacteremia, sensitive to CTX, will continue.   HR up to 150 with fever, scheduling high dose tylenol.  Ceft increased to 2g q24h  Repeat Blcx until clear  Urology consulted

## 2022-10-27 NOTE — PROGRESS NOTES
[facility] Medicine  Progress Note    Patient Name: Kriss Alvarez  MRN: [id number]  Patient Class: IP- Inpatient   Admission Date: 10/25/2022  Length of Stay: 2 days  Attending Physician: Dominick Mejia MD  Primary Care Provider: To Obtain Unable        Subjective:     Principal Problem:Sepsis        HPI:  This is a 36 year old female with a PMHx of morbid obesity, HTN who presents with abdominal pain and fevers.     Patient reports developing epigastric abdominal pain that started 1 day prior to presentation. She had no relieving or aggravating factors and the pain was not radiating. She denied having any associated symptoms including nausea, vomiting, diarrhea, constipation but did have chills at home. Her children are sick with URI symptoms. She denies having any dysuria, urinary frequency or SOB. In the ED, she was febrile (39.4), tachycardic (120s), otherwise stable. Labs showed leukocytosis (18.0). UA showed +1 leukocytes, positive nitrites and +3 blood.  CXR was negative. CT A/P showed delayed enhancement of the left kidney in left perinephric inflammation.  Findings suggest sequela of developing obstruction related to 1 cm calculus within the upper pole collecting system.  She was given fluids, and ceftriaxone. The patient was admitted for further management.       Overview/Hospital Course:  Patient admitted with severe sepsis, source pyelonephritis.  Found to be bacteremic with GNR.  Ceftriaxone increased to 2g q24.  Repeat blood cultures ordered.  Urology consulted. E.coli urosepsis/bacteremia, sensitive to CTX, will continue. HR up to 150 with fever, scheduling high dose tylenol.      NAEON. Denies SOB or CP.   No complaints       Review of Systems   Constitutional: Negative.    HENT: Negative.     Eyes: Negative.    Respiratory: Negative.     Cardiovascular: Negative.    Gastrointestinal:  Positive for abdominal pain.   Endocrine: Negative.    Genitourinary: Negative.     Musculoskeletal: Negative.    Skin: Negative.    Allergic/Immunologic: Negative.    Neurological: Negative.    Psychiatric/Behavioral: Negative.         Objective:     Vital Signs (Most Recent):  Temp: 99 °F (37.2 °C) (10/27/22 0756)  Pulse: 97 (10/27/22 0756)  Resp: 15 (10/27/22 0756)  BP: (!) 143/88 (10/27/22 0758)  SpO2: 97 % (10/27/22 0756)   Vital Signs (24h Range):  Temp:  [97.9 °F (36.6 °C)-102.8 °F (39.3 °C)] 99 °F (37.2 °C)  Pulse:  [] 97  Resp:  [14-20] 15  SpO2:  [97 %-100 %] 97 %  BP: (107-151)/(67-92) 143/88     Weight: (!) 152.9 kg (337 lb)  Body mass index is 51.24 kg/m².    Physical Exam  Vitals and nursing note reviewed.   Constitutional:       General: She is not in acute distress.     Appearance: Normal appearance. She is not ill-appearing.   HENT:      Head: Normocephalic and atraumatic.      Nose: Nose normal.      Mouth/Throat:      Mouth: Mucous membranes are moist.   Eyes:      Extraocular Movements: Extraocular movements intact.   Cardiovascular:      Rate and Rhythm: Normal rate.      Pulses: Normal pulses.      Heart sounds: No murmur heard.  Pulmonary:      Effort: Pulmonary effort is normal. No respiratory distress.   Abdominal:      General: Abdomen is flat.      Palpations: Abdomen is soft.      Tenderness: There is abdominal tenderness.      Comments: Epigastric tenderness    Musculoskeletal:      Right lower leg: No edema.      Left lower leg: No edema.   Skin:     General: Skin is warm.      Capillary Refill: Capillary refill takes less than 2 seconds.   Neurological:      General: No focal deficit present.      Mental Status: She is alert.   Psychiatric:         Mood and Affect: Mood normal.               No results found for this or any previous visit (from the past 24 hour(s)).      Microbiology Results (last 7 days)       Procedure Component Value Units Date/Time    Blood culture x two cultures. Draw prior to antibiotics. [[id number]]  (Abnormal) Collected: 10/25/22 6281     Order Status: Completed Specimen: Blood from Peripheral, Antecubital, Right Updated: 10/27/22 0854     Blood Culture, Routine Gram stain waldemar bottle: Gram negative rods      Results called to and read back by: GEORGE OCONNOR RN  10/26/2022  05:13AW      GRAM NEGATIVE PRIYANK    Narrative:      Aerobic and anaerobic    Blood culture x two cultures. Draw prior to antibiotics. [[id number]]  (Abnormal) Collected: 10/25/22 1550    Order Status: Completed Specimen: Blood from Peripheral, Antecubital, Left Updated: 10/27/22 0852     Blood Culture, Routine Gram stain waldemar bottle:      Gram negative rods      Results called to and read back by: GEORGE OCONNOR RN  10/26/2022  05:15AW      Gram stain aer bottle: Gram negative rods      Positive results previously called 10/26/2022  15:05      GRAM NEGATIVE PRIYANK  Identification and susceptibility pending      Narrative:      Aerobic and anaerobic    Urine culture [[id number]]  (Abnormal)  (Susceptibility) Collected: 10/25/22 1618    Order Status: Completed Specimen: Urine, Clean Catch Updated: 10/27/22 0719     Urine Culture, Routine ESCHERICHIA COLI  >100,000 cfu/ml      Narrative:      Indicated criteria for high risk culture:->Other  Other (specify):->protocol    Blood culture [[id number]] Collected: 10/26/22 0853    Order Status: Completed Specimen: Blood from Peripheral, Right Hand Updated: 10/26/22 1712     Blood Culture, Routine No Growth to date    Blood culture [[id number]] Collected: 10/26/22 0853    Order Status: Completed Specimen: Blood from Peripheral, Right Wrist Updated: 10/26/22 1712     Blood Culture, Routine No Growth to date             Imaging Results               CT Abdomen Pelvis With Contrast (Final result)  Result time 10/25/22 17:21:53      Final result by Dario Riggs MD (10/25/22 17:21:53)                   Impression:      This report was flagged in Epic as abnormal.    1. Delayed enhancement of the left kidney in left perinephric inflammation.  Findings  suggest sequela of developing obstruction related to 1 cm calculus within the upper pole collecting system.  Correlation is advised as superimposed infection not excluded.  Differential for this finding would include sequela of recently passed calculus given additional left nonobstructive nephrolithiasis.  Correlation is advised.  2. Possible hepatic steatosis, correlation with LFTs recommended.  3. Cholelithiasis without secondary findings to suggest acute cholecystitis.  4. Additional findings above.      Electronically signed by: Dario Riggs MD  Date:    10/25/2022  Time:    17:21               Narrative:    EXAMINATION:  CT ABDOMEN PELVIS WITH CONTRAST    CLINICAL HISTORY:  Abdominal pain, acute, nonlocalized;    TECHNIQUE:  Low dose axial images, sagittal and coronal reformations were obtained from the lung bases to the pubic symphysis following the IV administration of 100 mL of Omnipaque 350 no    COMPARISON:  None.    FINDINGS:  Images of the lower thorax are remarkable for minimal dependent atelectasis.    The liver is mildly hypoattenuating, possibly reflecting steatosis versus sequela of contrast phase.  Correlation with LFTs as warranted.  The spleen, pancreas and adrenal glands are unremarkable.  There is cholelithiasis without secondary findings to suggest acute cholecystitis.  The portal vein, splenic vein, SMV, celiac axis and SMA all are patent.  No significant abdominal lymphadenopathy.    The kidneys enhance asymmetrically noting delayed enhancement of the left kidney as compared to the right.  There is no right hydronephrosis or right nephrolithiasis.  The right ureter is unremarkable without calculi seen.  There is left perinephric and periureteral inflammation.  There is a prominent calculus within the central left renal collecting system measuring 1 cm.  There is additional punctate left nonobstructive nephrolithiasis.  The left ureter is unable to be followed in its entirety to the  urinary bladder, no definite calculi seen along its course.  The urinary bladder is unremarkable.  The uterus and adnexa are unremarkable.    There are a few scattered colonic diverticula without inflammation.  The terminal ileum and appendix are unremarkable.  The small bowel is grossly unremarkable.  No focal organized pelvic fluid collection.  There are a few scattered shotty periaortic and paracaval lymph nodes.    There are degenerative changes of the bilateral sacroiliac joints, pubic symphysis, and spine.  No significant inguinal lymphadenopathy.                                       X-Ray Chest AP Portable (Final result)  Result time 10/25/22 16:32:28      Final result by Vicki Dillon MD (10/25/22 16:32:28)                   Impression:      No acute intrathoracic process seen.      Electronically signed by: Vicki Dillon MD  Date:    10/25/2022  Time:    16:32               Narrative:    EXAMINATION:  XR CHEST AP PORTABLE    CLINICAL HISTORY:  Sepsis;    TECHNIQUE:  Single frontal view of the chest was performed.    COMPARISON:  None    FINDINGS:  The cardiac silhouette is normal in size.  The pulmonary vascularity is normal.  The lungs are well inflated and clear.  No focal airspace disease, no pleural effusion, no pneumothorax.                                            Assessment/Plan:      * Sepsis  This patient does have evidence of infective focus  My overall impression is sepsis. Vital signs were reviewed and noted in progress note.  Antibiotics given-   Antibiotics (From admission, onward)    Start     Stop Route Frequency Ordered    10/26/22 0715  cefTRIAXone (ROCEPHIN) 2 g/50 mL D5W IVPB         -- IV Every 24 hours (non-standard times) 10/26/22 0716        Cultures were taken-   Microbiology Results (last 7 days)     Procedure Component Value Units Date/Time    Blood culture x two cultures. Draw prior to antibiotics. [[id number]]  (Abnormal) Collected: 10/25/22 1614    Order Status:  Completed Specimen: Blood from Peripheral, Antecubital, Right Updated: 10/27/22 0854     Blood Culture, Routine Gram stain waldemar bottle: Gram negative rods      Results called to and read back by: GEORGE OCONNOR RN  10/26/2022  05:13AW      GRAM NEGATIVE PRIYANK    Narrative:      Aerobic and anaerobic    Blood culture x two cultures. Draw prior to antibiotics. [[id number]]  (Abnormal) Collected: 10/25/22 1550    Order Status: Completed Specimen: Blood from Peripheral, Antecubital, Left Updated: 10/27/22 0852     Blood Culture, Routine Gram stain waldemar bottle:      Gram negative rods      Results called to and read back by: GEORGE OCONNOR RN  10/26/2022  05:15AW      Gram stain aer bottle: Gram negative rods      Positive results previously called 10/26/2022  15:05      GRAM NEGATIVE PRIYANK  Identification and susceptibility pending      Narrative:      Aerobic and anaerobic    Urine culture [[id number]]  (Abnormal)  (Susceptibility) Collected: 10/25/22 1618    Order Status: Completed Specimen: Urine, Clean Catch Updated: 10/27/22 0719     Urine Culture, Routine ESCHERICHIA COLI  >100,000 cfu/ml      Narrative:      Indicated criteria for high risk culture:->Other  Other (specify):->protocol    Blood culture [[id number]] Collected: 10/26/22 0853    Order Status: Completed Specimen: Blood from Peripheral, Right Hand Updated: 10/26/22 1712     Blood Culture, Routine No Growth to date    Blood culture [[id number]] Collected: 10/26/22 0853    Order Status: Completed Specimen: Blood from Peripheral, Right Wrist Updated: 10/26/22 1712     Blood Culture, Routine No Growth to date        Latest lactate reviewed, they are-  Recent Labs   Lab 10/26/22  0853   LACTATE 0.9       Organ dysfunction indicated by N/A  Source- Urinary     Source control Achieved by- Antibiotics       Bacteremia  GNR bacteremia  E.coli urosepsis/bacteremia, sensitive to CTX, will continue.   HR up to 150 with fever, scheduling high dose tylenol.  Ceft increased to 2g  q24h  Repeat Blcx until clear  Urology consulted       Pyelonephritis  - Presented with abdominal pain, nausea and diarrhea.   - Septic on arrival, febrile (39.4), tachycardic (120s). Labs showed leukocytosis (18.0).   - UA showed +1 leukocytes, positive nitrites and +3 blood.    - CT A/P showed delayed enhancement of the left kidney in left perinephric inflammation.  Findings suggest sequela of developing obstruction related to 1 cm calculus within the upper pole collecting system.      Plan:   - Continue ceftriaxone   - F/U cultures and de-escalate      Primary hypertension  Resume home medications     Renal calculus  - CT A/P showed delayed enhancement of the left kidney in left perinephric inflammation.  Findings suggest sequela of developing obstruction related to 1 cm calculus within the upper pole collecting system  - Concern for an infected stone     Plan:   - Urology consult   - See plan for pyelonephritis       VTE Risk Mitigation (From admission, onward)         Ordered     heparin (porcine) injection 5,000 Units  Every 8 hours         10/25/22 2026     IP VTE HIGH RISK PATIENT  Once         10/25/22 2026     Place sequential compression device  Until discontinued         10/25/22 2026                Discharge Planning   ARIEL: 10/29/2022     Code Status: Full Code   Is the patient medically ready for discharge?:     Reason for patient still in hospital (select all that apply): Patient trending condition and Treatment  Discharge Plan A: Home                  Dominick Mejia MD  Department of Hospital Medicine   [facility] [place] - Med Surg

## 2022-10-27 NOTE — ASSESSMENT & PLAN NOTE
This patient does have evidence of infective focus  My overall impression is sepsis. Vital signs were reviewed and noted in progress note.  Antibiotics given-   Antibiotics (From admission, onward)    Start     Stop Route Frequency Ordered    10/26/22 0715  cefTRIAXone (ROCEPHIN) 2 g/50 mL D5W IVPB         -- IV Every 24 hours (non-standard times) 10/26/22 0716        Cultures were taken-   Microbiology Results (last 7 days)     Procedure Component Value Units Date/Time    Blood culture x two cultures. Draw prior to antibiotics. [[id number]]  (Abnormal) Collected: 10/25/22 1614    Order Status: Completed Specimen: Blood from Peripheral, Antecubital, Right Updated: 10/27/22 0854     Blood Culture, Routine Gram stain waldemar bottle: Gram negative rods      Results called to and read back by: GEORGE OCONNOR RN  10/26/2022  05:13AW      GRAM NEGATIVE PRIYANK    Narrative:      Aerobic and anaerobic    Blood culture x two cultures. Draw prior to antibiotics. [[id number]]  (Abnormal) Collected: 10/25/22 1550    Order Status: Completed Specimen: Blood from Peripheral, Antecubital, Left Updated: 10/27/22 0852     Blood Culture, Routine Gram stain waldemar bottle:      Gram negative rods      Results called to and read back by: GEORGE OCONNOR RN  10/26/2022  05:15AW      Gram stain aer bottle: Gram negative rods      Positive results previously called 10/26/2022  15:05      GRAM NEGATIVE PRIYANK  Identification and susceptibility pending      Narrative:      Aerobic and anaerobic    Urine culture [[id number]]  (Abnormal)  (Susceptibility) Collected: 10/25/22 1618    Order Status: Completed Specimen: Urine, Clean Catch Updated: 10/27/22 0719     Urine Culture, Routine ESCHERICHIA COLI  >100,000 cfu/ml      Narrative:      Indicated criteria for high risk culture:->Other  Other (specify):->protocol    Blood culture [[id number]] Collected: 10/26/22 0853    Order Status: Completed Specimen: Blood from Peripheral, Right Hand Updated: 10/26/22 1712      Blood Culture, Routine No Growth to date    Blood culture [[id number]] Collected: 10/26/22 0853    Order Status: Completed Specimen: Blood from Peripheral, Right Wrist Updated: 10/26/22 1712     Blood Culture, Routine No Growth to date        Latest lactate reviewed, they are-  Recent Labs   Lab 10/26/22  0853   LACTATE 0.9       Organ dysfunction indicated by N/A  Source- Urinary     Source control Achieved by- Antibiotics

## 2022-10-28 VITALS
WEIGHT: 293 LBS | HEART RATE: 90 BPM | OXYGEN SATURATION: 100 % | SYSTOLIC BLOOD PRESSURE: 134 MMHG | RESPIRATION RATE: 18 BRPM | HEIGHT: 68 IN | DIASTOLIC BLOOD PRESSURE: 82 MMHG | TEMPERATURE: 98 F | BODY MASS INDEX: 44.41 KG/M2

## 2022-10-28 LAB
BACTERIA BLD CULT: ABNORMAL

## 2022-10-28 PROCEDURE — 25000003 PHARM REV CODE 250: Performed by: STUDENT IN AN ORGANIZED HEALTH CARE EDUCATION/TRAINING PROGRAM

## 2022-10-28 PROCEDURE — 63600175 PHARM REV CODE 636 W HCPCS: Performed by: STUDENT IN AN ORGANIZED HEALTH CARE EDUCATION/TRAINING PROGRAM

## 2022-10-28 RX ORDER — CIPROFLOXACIN 500 MG/1
500 TABLET ORAL EVERY 12 HOURS
Qty: 22 TABLET | Refills: 0 | Status: SHIPPED | OUTPATIENT
Start: 2022-10-28 | End: 2022-11-08

## 2022-10-28 RX ORDER — ACETAMINOPHEN 500 MG
1000 TABLET ORAL EVERY 8 HOURS
Qty: 66 TABLET | Refills: 0 | Status: SHIPPED | OUTPATIENT
Start: 2022-10-28 | End: 2022-11-08

## 2022-10-28 RX ADMIN — CEFTRIAXONE SODIUM 2 G: 2 INJECTION, POWDER, FOR SOLUTION INTRAMUSCULAR; INTRAVENOUS at 07:10

## 2022-10-28 RX ADMIN — ACETAMINOPHEN 1000 MG: 500 TABLET ORAL at 06:10

## 2022-10-28 RX ADMIN — LABETALOL HYDROCHLORIDE 200 MG: 100 TABLET, FILM COATED ORAL at 08:10

## 2022-10-28 NOTE — PLAN OF CARE
[place] - Med Surg  Discharge Final Note    Patient clear to discharge from case management stand point. Reviewed follow up appointment with patient at bedside, listed on avs, pt verbalized understanding. Pt stated her sig other will be helping her home.    Primary Care Provider: [facility]     Expected Discharge Date: 10/28/2022    Final Discharge Note (most recent)       Final Note - 10/28/22 0837          Final Note    Assessment Type Final Discharge Note     Anticipated Discharge Disposition Home or Self Care     What phone number can be called within the next 1-3 days to see how you are doing after discharge? [phone]     Hospital Resources/Appts/Education Provided Provided patient/caregiver with written discharge plan information;Appointments scheduled and added to AVS        Post-Acute Status    Coverage PHN     Discharge Delays None known at this time                     Important Message from Medicare             Contact Info       [facility]    [address] [place] [address]  Phone: [phone]       Next Steps: Schedule an appointment as soon as possible for a visit on 10/31/2022    Instructions: Hospital follow up appointment @ 2:30pm

## 2022-10-28 NOTE — PLAN OF CARE
10/28/22 0850   Medicare Message   Important Message from Medicare regarding Discharge Appeal Rights Given to patient/caregiver;Explained to patient/caregiver;Signed/date by patient/caregiver   Date IMM was signed 10/28/22   Time IMM was signed 0850

## 2022-10-28 NOTE — DISCHARGE SUMMARY
[facility]  Discharge Summary      Patient Name: Kriss Alvarez  MRN: [id number]  Patient Class: IP- Inpatient  Admission Date: 10/25/2022  Hospital Length of Stay: 3 days  Discharge Date and Time:  10/28/2022 7:43 AM  Attending Physician: Dominick Mejia MD   Discharging Provider: Dominick Mejia MD  Primary Care Provider: To Obtain Unable      HPI:   This is a 36 year old female with a PMHx of morbid obesity, HTN who presents with abdominal pain and fevers.     Patient reports developing epigastric abdominal pain that started 1 day prior to presentation. She had no relieving or aggravating factors and the pain was not radiating. She denied having any associated symptoms including nausea, vomiting, diarrhea, constipation but did have chills at home. Her children are sick with URI symptoms. She denies having any dysuria, urinary frequency or SOB. In the ED, she was febrile (39.4), tachycardic (120s), otherwise stable. Labs showed leukocytosis (18.0). UA showed +1 leukocytes, positive nitrites and +3 blood.  CXR was negative. CT A/P showed delayed enhancement of the left kidney in left perinephric inflammation.  Findings suggest sequela of developing obstruction related to 1 cm calculus within the upper pole collecting system.  She was given fluids, and ceftriaxone. The patient was admitted for further management.       * No surgery found *      Hospital Course:   Patient admitted with severe sepsis, source pyelonephritis.  Found to be bacteremic with GNR.  Ceftriaxone increased to 2g q24.  Repeat blood cultures ordered.  Urology consulted. E.coli urosepsis/bacteremia, sensitive to CTX, will continue. HR up to 150 with fever, scheduling high dose tylenol.      Take Cipro twice daily for 11 more days, complete the whole course- don't stop early.    It is not uncommon to continue to have fevers while being treated for Pyelonephritis, take 1000 mg tylenol every 8 hours to stay ahead of it. You  can take occasional ibuprofen but would not take much of it as it is filtered through the kidneys and your kidney is in a precarious state, so cautioun with IBU/Motrin/Aleve etc.    Follow up with PCP         Dominick Mejia MD  Internal Medicine Staff      General: Negative for syncope or chills.  HEENT: Negative for headaches, change in vision, difficulty swallowing.  Cardiac: Negative for chest pain, palpitations, orthopnea, or PND.  Pulmonary: Negative for dyspnea, cough, hemoptysis, or wheezing.    Vitals:    10/27/22 2007 10/27/22 2331 10/28/22 0447 10/28/22 0706   BP: 137/83 125/66 132/75 123/77   BP Location: Left arm Left arm Left arm Left arm   Patient Position: Lying Lying Lying Lying   Pulse: 101 91 (!) 116 103   Resp: 20 20 20 18   Temp: 98.9 °F (37.2 °C) 98.7 °F (37.1 °C) 99.8 °F (37.7 °C) (!) 100.6 °F (38.1 °C)   TempSrc: Oral Oral Oral Oral   SpO2: 99% 99% 96% 97%   Weight:       Height:         GENERAL APPEARANCE: Well developed, well nourished, alert and cooperative  HEENT:     HEAD: NC/AT     EYES: PERRL, EOMI.  Vision is grossly intact.    . Teeth and gingiva in good general condition.  NECK: Neck supple, non-tender without LAD, masses or thyromegaly.  CARDIAC: Normal S1 and S2. No S3, S4 or murmurs  LUNGS: Clear to auscultation and percussion without rales, rhonchi, wheezing or diminished breath sounds.  ABDOMEN: Positive bowel sounds. Soft, nondistended, nontender. No guarding or rebound. No masses.  MSK: No joint erythema or tenderness. Normal muscular development. Normal gait.  BACK: Examination of the spine reveals normal gait and posture, no spinal deformity, symmetry of spinal muscles, without tenderness, decreased range of motion or muscular spasm.  EXTREMITIES: No significant deformity or joint abnormality. No edema. Peripheral pulses intact. No varicosities.  LOWER EXTREMITY: Examination of both feet reveals all toes to be normal in size and symmetry, normal range of  motion  NEUROLOGICAL: CN II-XII intact. Strength and sensation symmetric and intact throughout. Reflexes 2+ throughout.   SKIN: Skin normal color, texture and turgor with no lesions or eruptions.  PSYCHIATRIC: The mental examination revealed the patient was oriented to person, place, and time.     Goals of Care Treatment Preferences:  Code Status: Full Code      Consults:   Consults (From admission, onward)        Status Ordering Provider     Inpatient consult to Urology  Once        Provider:  Andria Corley MD    Completed CAT GONZALEZ          No new Assessment & Plan notes have been filed under this hospital service since the last note was generated.  Service: Hospital Medicine    Final Active Diagnoses:    Diagnosis Date Noted POA    PRINCIPAL PROBLEM:  Sepsis [A41.9] 10/25/2022 Yes    Bacteremia [R78.81] 10/26/2022 Yes    Pyelonephritis [N12] 10/25/2022 Yes    Renal calculus [N20.0] 10/25/2022 Yes    Primary hypertension [I10] 10/25/2022 Yes      Problems Resolved During this Admission:       Discharged Condition: good    Disposition:     Follow Up:   Follow-up Information     [facility]. Schedule an appointment as soon as possible for a visit in 1 week(s).    Contact information:  [address] [place] [address]  Phone: [phone]                     Patient Instructions:      Ambulatory referral/consult to Internal Medicine   Standing Status: Future   Referral Priority: Routine Referral Type: Consultation   Referral Reason: Specialty Services Required   Requested Specialty: Internal Medicine   Number of Visits Requested: 1       Significant Diagnostic Studies:     Recent Results (from the past 100 hour(s))   POCT URINALYSIS W/O SCOPE    Collection Time: 10/25/22  3:45 PM   Result Value Ref Range    Glucose, UA Negative     Bilirubin, UA 1+ (A)     Ketones, UA Trace (A)     Spec Grav UA 1.020     Blood, UA 3+ (A)     PH, UA 7.0     Protein, UA 3+ (A)     Urobilinogen, UA 4.0 (A)  E.U./dL    Nitrite, UA Positive (P)     Leukocytes, UA 1+ (A)     Color, UA Yellow     Clarity, UA Clear    Blood culture x two cultures. Draw prior to antibiotics.    Collection Time: 10/25/22  3:50 PM    Specimen: Peripheral, Antecubital, Left; Blood   Result Value Ref Range    Blood Culture, Routine Gram stain waldemar bottle:     Blood Culture, Routine Gram negative rods     Blood Culture, Routine       Results called to and read back by: GEORGE OCONNOR RN  10/26/2022  05:15AW    Blood Culture, Routine Gram stain aer bottle: Gram negative rods     Blood Culture, Routine       Positive results previously called 10/26/2022  15:05    Blood Culture, Routine ESCHERICHIA COLI (A)        Susceptibility    Escherichia coli - CULTURE, BLOOD     Amp/Sulbactam 16/8 Intermediate mcg/mL     Ampicillin >16 Resistant mcg/mL     Amox/K Clav'ate >16/8 Resistant mcg/mL     Ceftriaxone <=1 Sensitive mcg/mL     Cefazolin 16 Intermediate mcg/mL     Ciprofloxacin <=1 Sensitive mcg/mL     Cefepime <=2 Sensitive mcg/mL     Ertapenem <=0.5 Sensitive mcg/mL     Gentamicin <=4 Sensitive mcg/mL     Levofloxacin <=2 Sensitive mcg/mL     Meropenem <=1 Sensitive mcg/mL     Minocycline <=4 Sensitive mcg/mL     Piperacillin/Tazo <=16 Sensitive mcg/mL     Trimeth/Sulfa <=2/38 Sensitive mcg/mL     Tetracycline <=4 Sensitive mcg/mL     Tobramycin <=4 Sensitive mcg/mL   POCT urine pregnancy    Collection Time: 10/25/22  3:51 PM   Result Value Ref Range    POC Preg Test, Ur Negative Negative     Acceptable Yes    POCT CMP    Collection Time: 10/25/22  4:00 PM   Result Value Ref Range    Albumin, POC 3.6 3.3 - 5.5 g/dL    Alkaline Phosphatase, POC 90 42 - 141 U/L    ALT (SGPT), POC 21 10 - 47 U/L    AST (SGOT), POC 19 11 - 38 U/L    POC BUN 10 7 - 22 mg/dL    Calcium, POC 10.1 8.0 - 10.3 mg/dL    POC Chloride 100 98 - 108 mmol/L    POC Creatinine 0.8 0.6 - 1.2 mg/dL    POC Glucose 131 (H) 73 - 118 mg/dL    POC Potassium 3.4 (L) 3.6 - 5.1  mmol/L    POC Sodium 138 128 - 145 mmol/L    Bilirubin, POC 1.1 0.2 - 1.6 mg/dL    POC TCO2 24 18 - 33 mmol/L    Protein, POC 8.4 (H) 6.4 - 8.1 g/dL   POCT Rapid Influenza A/B    Collection Time: 10/25/22  4:01 PM   Result Value Ref Range    Influenza B Ag negative Positive/Negative    Inflenza A Ag negative Positive/Negative   Blood culture x two cultures. Draw prior to antibiotics.    Collection Time: 10/25/22  4:14 PM    Specimen: Peripheral, Antecubital, Right; Blood   Result Value Ref Range    Blood Culture, Routine Gram stain waldemar bottle: Gram negative rods     Blood Culture, Routine       Results called to and read back by: GEORGE OCONNOR RN  10/26/2022  05:13AW    Blood Culture, Routine (A)      ESCHERICHIA COLI  For susceptibility see order #[id number]     Urine culture    Collection Time: 10/25/22  4:18 PM    Specimen: Urine, Clean Catch   Result Value Ref Range    Urine Culture, Routine ESCHERICHIA COLI  >100,000 cfu/ml   (A)        Susceptibility    Escherichia coli - CULTURE, URINE     Amp/Sulbactam <=8/4 Sensitive mcg/mL     Ampicillin >16 Resistant mcg/mL     Amox/K Clav'ate >16/8 Resistant mcg/mL     Ceftriaxone <=1 Sensitive mcg/mL     Cefazolin 16 Intermediate mcg/mL     Ciprofloxacin <=1 Sensitive mcg/mL     Cefepime <=2 Sensitive mcg/mL     Ertapenem <=0.5 Sensitive mcg/mL     Nitrofurantoin <=32 Sensitive mcg/mL     Gentamicin <=4 Sensitive mcg/mL     Levofloxacin <=2 Sensitive mcg/mL     Meropenem <=1 Sensitive mcg/mL     Minocycline <=4 Sensitive mcg/mL     Piperacillin/Tazo <=16 Sensitive mcg/mL     Trimeth/Sulfa <=2/38 Sensitive mcg/mL     Tobramycin <=4 Sensitive mcg/mL   POCT Rapid Strep A    Collection Time: 10/25/22  4:43 PM   Result Value Ref Range    POC Rapid Strep A negative Positive/Negative   POCT COVID-19 Rapid Screening    Collection Time: 10/25/22  4:52 PM   Result Value Ref Range    POC Rapid COVID Negative Negative     Acceptable Yes    ISTAT PROCEDURE     Collection Time: 10/25/22  5:05 PM   Result Value Ref Range    POC PH 7.354 7.35 - 7.45    POC PCO2 45.2 (H) 35 - 45 mmHg    POC PO2 163 (HH) 40 - 60 mmHg    POC HCO3 25.2 24 - 28 mmol/L    POC BE -1 -2 to 2 mmol/L    POC SATURATED O2 99 95 - 100 %    POC Lactate 2.34 (H) 0.5 - 2.2 mmol/L    POC TCO2 27 24 - 29 mmol/L    Sample VENOUS     Site Other     Allens Test N/A    ISTAT PROCEDURE    Collection Time: 10/25/22  6:39 PM   Result Value Ref Range    POC PH 7.421 7.35 - 7.45    POC PCO2 35.9 35 - 45 mmHg    POC PO2 50 40 - 60 mmHg    POC HCO3 23.3 (L) 24 - 28 mmol/L    POC BE -1 -2 to 2 mmol/L    POC SATURATED O2 86 (L) 95 - 100 %    POC Lactate 0.74 0.5 - 2.2 mmol/L    POC TCO2 24 24 - 29 mmol/L    Sample VENOUS     Site Other     Allens Test N/A    Phosphorus    Collection Time: 10/26/22  5:42 AM   Result Value Ref Range    Phosphorus 2.6 (L) 2.7 - 4.5 mg/dL   Magnesium    Collection Time: 10/26/22  5:42 AM   Result Value Ref Range    Magnesium 1.6 1.6 - 2.6 mg/dL   Basic Metabolic Panel    Collection Time: 10/26/22  5:42 AM   Result Value Ref Range    Sodium 136 136 - 145 mmol/L    Potassium 3.7 3.5 - 5.1 mmol/L    Chloride 104 95 - 110 mmol/L    CO2 21 (L) 23 - 29 mmol/L    Glucose 130 (H) 70 - 110 mg/dL    BUN 9 6 - 20 mg/dL    Creatinine 0.8 0.5 - 1.4 mg/dL    Calcium 8.6 (L) 8.7 - 10.5 mg/dL    Anion Gap 11 8 - 16 mmol/L    eGFR >60 >60 mL/min/1.73 m^2   CBC Auto Differential    Collection Time: 10/26/22  5:42 AM   Result Value Ref Range    WBC 12.07 3.90 - 12.70 K/uL    RBC 3.30 (L) 4.00 - 5.40 M/uL    Hemoglobin 9.6 (L) 12.0 - 16.0 g/dL    Hematocrit 31.2 (L) 37.0 - 48.5 %    MCV 95 82 - 98 fL    MCH 29.1 27.0 - 31.0 pg    MCHC 30.8 (L) 32.0 - 36.0 g/dL    RDW 14.7 (H) 11.5 - 14.5 %    Platelets 243 150 - 450 K/uL    MPV 9.2 9.2 - 12.9 fL    Immature Granulocytes 0.6 (H) 0.0 - 0.5 %    Gran # (ANC) 10.1 (H) 1.8 - 7.7 K/uL    Immature Grans (Abs) 0.07 (H) 0.00 - 0.04 K/uL    Lymph # 0.8 (L) 1.0 - 4.8 K/uL     Mono # 1.0 0.3 - 1.0 K/uL    Eos # 0.0 0.0 - 0.5 K/uL    Baso # 0.02 0.00 - 0.20 K/uL    nRBC 0 0 /100 WBC    Gran % 83.8 (H) 38.0 - 73.0 %    Lymph % 7.0 (L) 18.0 - 48.0 %    Mono % 8.2 4.0 - 15.0 %    Eosinophil % 0.2 0.0 - 8.0 %    Basophil % 0.2 0.0 - 1.9 %    Differential Method Automated    Blood culture    Collection Time: 10/26/22  8:53 AM    Specimen: Peripheral, Right Wrist; Blood   Result Value Ref Range    Blood Culture, Routine No Growth to date     Blood Culture, Routine No Growth to date    Blood culture    Collection Time: 10/26/22  8:53 AM    Specimen: Peripheral, Right Hand; Blood   Result Value Ref Range    Blood Culture, Routine No Growth to date     Blood Culture, Routine No Growth to date    Lactic Acid, Plasma    Collection Time: 10/26/22  8:53 AM   Result Value Ref Range    Lactate (Lactic Acid) 0.9 0.5 - 2.2 mmol/L       Microbiology Results (last 7 days)     Procedure Component Value Units Date/Time    Blood culture x two cultures. Draw prior to antibiotics. [[id number]]  (Abnormal) Collected: 10/25/22 1614    Order Status: Completed Specimen: Blood from Peripheral, Antecubital, Right Updated: 10/28/22 0732     Blood Culture, Routine Gram stain waldemar bottle: Gram negative rods      Results called to and read back by: GEORGE OCONNOR RN  10/26/2022  05:13AW      ESCHERICHIA COLI  For susceptibility see order #[id number]      Narrative:      Aerobic and anaerobic    Blood culture x two cultures. Draw prior to antibiotics. [[id number]]  (Abnormal)  (Susceptibility) Collected: 10/25/22 1550    Order Status: Completed Specimen: Blood from Peripheral, Antecubital, Left Updated: 10/28/22 0732     Blood Culture, Routine Gram stain waldemar bottle:      Gram negative rods      Results called to and read back by: GEORGE OCONNOR RN  10/26/2022  05:15AW      Gram stain aer bottle: Gram negative rods      Positive results previously called 10/26/2022  15:05      ESCHERICHIA COLI    Narrative:      Aerobic and  anaerobic    Blood culture [[id number]] Collected: 10/26/22 0853    Order Status: Completed Specimen: Blood from Peripheral, Right Wrist Updated: 10/27/22 1103     Blood Culture, Routine No Growth to date      No Growth to date    Blood culture [[id number]] Collected: 10/26/22 0853    Order Status: Completed Specimen: Blood from Peripheral, Right Hand Updated: 10/27/22 1103     Blood Culture, Routine No Growth to date      No Growth to date    Urine culture [[id number]]  (Abnormal)  (Susceptibility) Collected: 10/25/22 1618    Order Status: Completed Specimen: Urine, Clean Catch Updated: 10/27/22 0719     Urine Culture, Routine ESCHERICHIA COLI  >100,000 cfu/ml      Narrative:      Indicated criteria for high risk culture:->Other  Other (specify):->protocol          Imaging Results           CT Abdomen Pelvis With Contrast (Final result)  Result time 10/25/22 17:21:53    Final result by Dario Riggs MD (10/25/22 17:21:53)                 Impression:      This report was flagged in Epic as abnormal.    1. Delayed enhancement of the left kidney in left perinephric inflammation.  Findings suggest sequela of developing obstruction related to 1 cm calculus within the upper pole collecting system.  Correlation is advised as superimposed infection not excluded.  Differential for this finding would include sequela of recently passed calculus given additional left nonobstructive nephrolithiasis.  Correlation is advised.  2. Possible hepatic steatosis, correlation with LFTs recommended.  3. Cholelithiasis without secondary findings to suggest acute cholecystitis.  4. Additional findings above.      Electronically signed by: Dario Riggs MD  Date:    10/25/2022  Time:    17:21             Narrative:    EXAMINATION:  CT ABDOMEN PELVIS WITH CONTRAST    CLINICAL HISTORY:  Abdominal pain, acute, nonlocalized;    TECHNIQUE:  Low dose axial images, sagittal and coronal reformations were obtained from the lung bases to the  pubic symphysis following the IV administration of 100 mL of Omnipaque 350 no    COMPARISON:  None.    FINDINGS:  Images of the lower thorax are remarkable for minimal dependent atelectasis.    The liver is mildly hypoattenuating, possibly reflecting steatosis versus sequela of contrast phase.  Correlation with LFTs as warranted.  The spleen, pancreas and adrenal glands are unremarkable.  There is cholelithiasis without secondary findings to suggest acute cholecystitis.  The portal vein, splenic vein, SMV, celiac axis and SMA all are patent.  No significant abdominal lymphadenopathy.    The kidneys enhance asymmetrically noting delayed enhancement of the left kidney as compared to the right.  There is no right hydronephrosis or right nephrolithiasis.  The right ureter is unremarkable without calculi seen.  There is left perinephric and periureteral inflammation.  There is a prominent calculus within the central left renal collecting system measuring 1 cm.  There is additional punctate left nonobstructive nephrolithiasis.  The left ureter is unable to be followed in its entirety to the urinary bladder, no definite calculi seen along its course.  The urinary bladder is unremarkable.  The uterus and adnexa are unremarkable.    There are a few scattered colonic diverticula without inflammation.  The terminal ileum and appendix are unremarkable.  The small bowel is grossly unremarkable.  No focal organized pelvic fluid collection.  There are a few scattered shotty periaortic and paracaval lymph nodes.    There are degenerative changes of the bilateral sacroiliac joints, pubic symphysis, and spine.  No significant inguinal lymphadenopathy.                               X-Ray Chest AP Portable (Final result)  Result time 10/25/22 16:32:28    Final result by Vicki Dillon MD (10/25/22 16:32:28)                 Impression:      No acute intrathoracic process seen.      Electronically signed by: Vicki Dillon  MD  Date:    10/25/2022  Time:    16:32             Narrative:    EXAMINATION:  XR CHEST AP PORTABLE    CLINICAL HISTORY:  Sepsis;    TECHNIQUE:  Single frontal view of the chest was performed.    COMPARISON:  None    FINDINGS:  The cardiac silhouette is normal in size.  The pulmonary vascularity is normal.  The lungs are well inflated and clear.  No focal airspace disease, no pleural effusion, no pneumothorax.                                    Pending Diagnostic Studies:     None         Medications:  Reconciled Home Medications:      Medication List      START taking these medications    acetaminophen 500 MG tablet  Commonly known as: TYLENOL  Take 2 tablets (1,000 mg total) by mouth every 8 (eight) hours. for 11 days     ciprofloxacin HCl 500 MG tablet  Commonly known as: CIPRO  Take 1 tablet (500 mg total) by mouth every 12 (twelve) hours. for 11 days        CONTINUE taking these medications    labetaloL 200 MG tablet  Commonly known as: NORMODYNE  Take 200 mg by mouth 2 (two) times daily.     PRENATAL-1 ORAL  Take by mouth.            Indwelling Lines/Drains at time of discharge:   Lines/Drains/Airways     None                 Time spent on the discharge of patient: 35 minutes         Dominick Mejia MD  Department of Hospital Medicine  [facility]

## 2022-10-28 NOTE — DISCHARGE INSTRUCTIONS
Take Cipro twice daily for 11 more days, complete the whole course- don't stop early.   It is not uncommon to continue to have fevers while being treated for Pyelonephritis, take 1000 mg tylenol every 8 hours to stay ahead of it. You can take occasional ibuprofen but would not take much of it as it is filtered through the kidneys and your kidney is in a precarious state, so cautioun with IBU/Motrin/Aleve etc.   Follow up with PCP         Dominick Mejia MD  Internal Medicine Staff

## 2022-10-30 LAB
BACTERIA BLD CULT: NORMAL
BACTERIA BLD CULT: NORMAL

## 2022-11-02 ENCOUNTER — PATIENT OUTREACH (OUTPATIENT)
Dept: ADMINISTRATIVE | Facility: CLINIC | Age: 37
End: 2022-11-02
Payer: MEDICAID

## 2022-11-02 NOTE — PROGRESS NOTES
C3 Nurse made second attempt to reach patient for TCC call. Left voicemail asking patient to please call [phone] and leave first name, last name, and , and Traci will return call.

## 2022-11-02 NOTE — PROGRESS NOTES
C3 nurse attempted to contact Kriss Alvarez  for a TCC post hospital discharge follow up call. No answer. The patient does not have a scheduled HOSFU appointment, and the pt does not have an [facility] PCP.

## 2023-01-30 PROBLEM — N12 PYELONEPHRITIS: Status: RESOLVED | Noted: 2022-10-25 | Resolved: 2023-01-30

## 2023-01-30 PROBLEM — A41.9 SEPSIS: Status: RESOLVED | Noted: 2022-10-25 | Resolved: 2023-01-30

## 2023-08-24 NOTE — PLAN OF CARE
Chart check complete, pt AAOx4, able to verbalize needs.  IV flushed and salinel ocked.  Tele box monitored.  Pt able to ambulate to the restroom and void without difficulty, no BM this shift.  Pt denies pain.  Diet tolerated well, no reports of N/V.  NO acute distress noted, pt free from falls or injury this shift.  Temperate monitored, WNL throughout the shift.  Bed in low position, wheels locked, call light in reach for assistance, will continue to monitor.       Problem: Adult Inpatient Plan of Care  Goal: Plan of Care Review  Outcome: Ongoing, Progressing     Problem: Adult Inpatient Plan of Care  Goal: Patient-Specific Goal (Individualized)  Outcome: Ongoing, Progressing     Problem: Adult Inpatient Plan of Care  Goal: Absence of Hospital-Acquired Illness or Injury  Outcome: Ongoing, Progressing     Problem: Adult Inpatient Plan of Care  Goal: Optimal Comfort and Wellbeing  Outcome: Ongoing, Progressing     Problem: Bariatric Environmental Safety  Goal: Safety Maintained with Care  Outcome: Ongoing, Progressing     Problem: Infection Progression (Sepsis/Septic Shock)  Goal: Absence of Infection Signs and Symptoms  Outcome: Ongoing, Progressing      [facility]

## 2023-11-27 ENCOUNTER — HOSPITAL ENCOUNTER (EMERGENCY)
Facility: HOSPITAL | Age: 38
Discharge: HOME | End: 2023-11-27
Attending: EMERGENCY MEDICINE
Payer: MEDICARE

## 2023-11-27 VITALS
OXYGEN SATURATION: 98 % | DIASTOLIC BLOOD PRESSURE: 94 MMHG | WEIGHT: 293 LBS | HEIGHT: 68 IN | BODY MASS INDEX: 44.41 KG/M2 | SYSTOLIC BLOOD PRESSURE: 164 MMHG | HEART RATE: 94 BPM | RESPIRATION RATE: 16 BRPM | TEMPERATURE: 99 F

## 2023-11-27 DIAGNOSIS — M54.50 ACUTE BILATERAL LOW BACK PAIN WITHOUT SCIATICA: ICD-10-CM

## 2023-11-27 DIAGNOSIS — R35.0 URINARY FREQUENCY: Primary | ICD-10-CM

## 2023-11-27 LAB
B-HCG UR QL: NEGATIVE
BILIRUBIN, POC UA: NEGATIVE
BLOOD, POC UA: ABNORMAL
CLARITY, POC UA: CLEAR
COLOR, POC UA: YELLOW
CTP QC/QA: YES
GLUCOSE, POC UA: NEGATIVE
KETONES, POC UA: NEGATIVE
LEUKOCYTE EST, POC UA: NEGATIVE
NITRITE, POC UA: NEGATIVE
PH UR STRIP: 6.5 [PH]
PROTEIN, POC UA: ABNORMAL
SPECIFIC GRAVITY, POC UA: 1.02
UROBILINOGEN, POC UA: 0.2 E.U./DL

## 2023-11-27 PROCEDURE — 87086 URINE CULTURE/COLONY COUNT: CPT | Performed by: EMERGENCY MEDICINE

## 2023-11-27 PROCEDURE — 81025 URINE PREGNANCY TEST: CPT | Mod: ER | Performed by: EMERGENCY MEDICINE

## 2023-11-27 PROCEDURE — 81025 URINE PREGNANCY TEST: CPT | Mod: ER

## 2023-11-27 PROCEDURE — 99284 EMERGENCY DEPT VISIT MOD MDM: CPT | Mod: 25,ER

## 2023-11-27 RX ORDER — IBUPROFEN 400 MG/1
400 TABLET ORAL EVERY 6 HOURS PRN
Qty: 20 TABLET | Refills: 0 | OUTPATIENT
Start: 2023-11-27 | End: 2023-12-08

## 2023-11-27 RX ORDER — NITROFURANTOIN 25; 75 MG/1; MG/1
100 CAPSULE ORAL 2 TIMES DAILY
Qty: 10 CAPSULE | Refills: 0 | Status: SHIPPED | OUTPATIENT
Start: 2023-11-27 | End: 2023-12-02

## 2023-11-27 NOTE — ED PROVIDER NOTES
Encounter Date: 11/27/2023    SCRIBE #1 NOTE: I, Christiana Tran, am scribing for, and in the presence of,  Lilibeth Beltran MD. I have scribed the following portions of the note - Other sections scribed: HPI, ROS.       History     Chief Complaint   Patient presents with    Urinary Tract Infection     Low back pain and irritation with urination.  Onset for several days, denies n/v/fever or discharge     Kriss Alvarez is a 38 y.o. female, with a PMHx of HTN, who presents to the ED with dysuria x2 days. Patient reports associated frequency and a sharp right-sided low back pain. Patient reports she has been moving furniture in the last few days. Reports Hx of renal stones w/ pregnancy, denies symptoms feel similar. Denies Hx of UTI. Denies concern for STD's. No other exacerbating or alleviating factors. Denies hematuria, nausea, vomiting, vaginal discharge or other associated symptoms.       The history is provided by the patient. No  was used.     Review of patient's allergies indicates:  No Known Allergies  Past Medical History:   Diagnosis Date    Hypertension      No past surgical history on file.  No family history on file.  Social History     Tobacco Use    Smoking status: Never     Review of Systems   Constitutional: Negative.    HENT: Negative.     Eyes: Negative.    Respiratory: Negative.     Cardiovascular: Negative.    Gastrointestinal: Negative.  Negative for nausea and vomiting.   Genitourinary:  Positive for dysuria and frequency. Negative for hematuria and vaginal discharge.   Musculoskeletal: Negative.    Skin: Negative.    Neurological: Negative.        Physical Exam     Initial Vitals [11/27/23 0758]   BP Pulse Resp Temp SpO2   (S) (!) 170/115 106 20 98.6 °F (37 °C) 100 %      MAP       --         Physical Exam    Nursing note and vitals reviewed.  Constitutional: She appears well-developed and well-nourished. She is not diaphoretic. No distress.   HENT:   Head: Normocephalic  and atraumatic.   Nose: Nose normal.   Eyes: EOM are normal. Pupils are equal, round, and reactive to light.   Neck: Neck supple. No JVD present.   Normal range of motion.  Cardiovascular:  Normal rate and regular rhythm.           Pulmonary/Chest: No stridor. No respiratory distress.   Abdominal: Abdomen is soft. Bowel sounds are normal. She exhibits no distension. There is no abdominal tenderness.   Musculoskeletal:         General: No tenderness or edema. Normal range of motion.      Cervical back: Normal range of motion and neck supple.     Neurological: She is alert and oriented to person, place, and time. She has normal strength.   Skin: Skin is warm and dry. Capillary refill takes less than 2 seconds. No rash noted. No erythema.         ED Course   Procedures  Labs Reviewed   POCT URINALYSIS W/O SCOPE - Abnormal; Notable for the following components:       Result Value    Blood, UA Trace-intact (*)     Protein, UA 2+ (*)     All other components within normal limits   CULTURE, URINE   POCT URINE PREGNANCY   POCT URINALYSIS(INSTRUMENT)          Imaging Results    None          Medications - No data to display  Medical Decision Making  Amount and/or Complexity of Data Reviewed  Labs: ordered. Decision-making details documented in ED Course.    Risk  Prescription drug management.      MDM:    38-year-old female with past medical history as noted above presenting with urinary frequency, low back pain.  Physical exam as noted above.  ED workup notable for urinalysis with 2+ protein, negative nitrite, negative leukocytes trace blood, negative pregnancy test.  Patient presentation consistent with urinary frequency, will send urine culture as she has had pyelonephritis previously, and started on Macrobid.  Additionally some mild lower lumbar tenderness, no evidence of sciatica, patient reports increased movement this week in possibly relating to a musculoskeletal etiology.  Will treat symptomatically this point.  Do  not suspect sepsis, pyelonephritis, ureterolithiasis, intraspinal pathology, or any further surgical or medical emergency.  Discussed diagnosis and further treatment with patient, including f/u.  Return precautions given and all questions answered.  Patient in understanding of plan.  Pt discharged to home improved and stable.        Note was created using voice recognition software. Note may have occasional typographical or grammatical errors, garbled syntax, and other bizarre constructions that may not have been identified and edited despite good juvencio initial review prior to signing.             Scribe Attestation:   Scribe #1: I performed the above scribed service and the documentation accurately describes the services I performed. I attest to the accuracy of the note.                      I, Bradley Beltran M.D., personally performed the services described in this documentation.  All medical record entries made by the scribe were at my direction and in my presence.  I have reviewed the chart and agree that the record reflects my personal performance and is accurate and complete.        Clinical Impression:  Final diagnoses:  [R35.0] Urinary frequency (Primary)  [M54.50] Acute bilateral low back pain without sciatica          ED Disposition Condition    Discharge Stable          ED Prescriptions       Medication Sig Dispense Start Date End Date Auth. Provider    ibuprofen (ADVIL,MOTRIN) 400 MG tablet Take 1 tablet (400 mg total) by mouth every 6 (six) hours as needed. 20 tablet 11/27/2023 -- Bradley Beltran MD    nitrofurantoin, macrocrystal-monohydrate, (MACROBID) 100 MG capsule Take 1 capsule (100 mg total) by mouth 2 (two) times daily. for 5 days 10 capsule 11/27/2023 12/2/2023 Bradley Beltran MD          Follow-up Information       Follow up With Specialties Details Why Contact Info    [facility] ED Emergency Medicine Go to  If symptoms worsen [address] [facility]  [address]  [phone]    Didier Bradley MD Internal Medicine Schedule an appointment as soon as possible for a visit  As needed [address] [facility]  [place] [address]  [phone]               Bradley Beltran MD  11/27/23 0835

## 2023-11-29 LAB — BACTERIA UR CULT: NORMAL

## 2023-12-08 ENCOUNTER — HOSPITAL ENCOUNTER (EMERGENCY)
Facility: HOSPITAL | Age: 38
Discharge: HOME | End: 2023-12-08
Attending: EMERGENCY MEDICINE
Payer: MEDICARE

## 2023-12-08 VITALS
OXYGEN SATURATION: 100 % | TEMPERATURE: 98 F | DIASTOLIC BLOOD PRESSURE: 74 MMHG | HEART RATE: 87 BPM | WEIGHT: 293 LBS | HEIGHT: 68 IN | RESPIRATION RATE: 17 BRPM | SYSTOLIC BLOOD PRESSURE: 142 MMHG | BODY MASS INDEX: 44.41 KG/M2

## 2023-12-08 DIAGNOSIS — R07.9 CHEST PAIN: ICD-10-CM

## 2023-12-08 DIAGNOSIS — J30.2 SEASONAL ALLERGIES: Primary | ICD-10-CM

## 2023-12-08 LAB
ALBUMIN SERPL-MCNC: 4.7 G/DL (ref 3.3–5.5)
ALP SERPL-CCNC: 65 U/L (ref 42–141)
B-HCG UR QL: NEGATIVE
BILIRUB SERPL-MCNC: 0.4 MG/DL (ref 0.2–1.6)
BILIRUBIN, POC UA: NEGATIVE
BLOOD, POC UA: ABNORMAL
BUN SERPL-MCNC: 12 MG/DL (ref 7–22)
CALCIUM SERPL-MCNC: 9.9 MG/DL (ref 8–10.3)
CHLORIDE SERPL-SCNC: 105 MMOL/L (ref 98–108)
CLARITY, POC UA: CLEAR
COLOR, POC UA: YELLOW
CREAT SERPL-MCNC: 0.5 MG/DL (ref 0.6–1.2)
CTP QC/QA: YES
GLUCOSE SERPL-MCNC: 110 MG/DL (ref 73–118)
GLUCOSE, POC UA: NEGATIVE
HCT, POC: NORMAL
HGB, POC: NORMAL (ref 14–18)
KETONES, POC UA: NEGATIVE
LEUKOCYTE EST, POC UA: NEGATIVE
MCH, POC: NORMAL
MCHC, POC: NORMAL
MCV, POC: NORMAL
MPV, POC: NORMAL
NITRITE, POC UA: NEGATIVE
PH UR STRIP: 6 [PH]
POC ALT (SGPT): 18 U/L (ref 10–47)
POC AST (SGOT): 27 U/L (ref 11–38)
POC CARDIAC TROPONIN I: 0 NG/ML (ref 0–0.08)
POC PLATELET COUNT: NORMAL
POC PTINR: 1 (ref 0.9–1.2)
POC PTWBT: 12.1 SEC (ref 9.7–14.3)
POC TCO2: 27 MMOL/L (ref 18–33)
POTASSIUM BLD-SCNC: 5.1 MMOL/L (ref 3.6–5.1)
PROTEIN, POC UA: NEGATIVE
PROTEIN, POC: 8.7 G/DL (ref 6.4–8.1)
RBC, POC: NORMAL
RDW, POC: NORMAL
SAMPLE: NORMAL
SAMPLE: NORMAL
SODIUM BLD-SCNC: 138 MMOL/L (ref 128–145)
SPECIFIC GRAVITY, POC UA: >=1.03
UROBILINOGEN, POC UA: 0.2 E.U./DL
WBC, POC: NORMAL

## 2023-12-08 PROCEDURE — 93010 EKG 12-LEAD: ICD-10-PCS | Mod: ,,, | Performed by: INTERNAL MEDICINE

## 2023-12-08 PROCEDURE — 80053 COMPREHEN METABOLIC PANEL: CPT | Mod: ER

## 2023-12-08 PROCEDURE — 81003 URINALYSIS AUTO W/O SCOPE: CPT | Mod: ER

## 2023-12-08 PROCEDURE — 93010 ELECTROCARDIOGRAM REPORT: CPT | Mod: ,,, | Performed by: INTERNAL MEDICINE

## 2023-12-08 PROCEDURE — 81025 URINE PREGNANCY TEST: CPT | Mod: ER | Performed by: EMERGENCY MEDICINE

## 2023-12-08 PROCEDURE — 93005 ELECTROCARDIOGRAM TRACING: CPT | Mod: ER

## 2023-12-08 PROCEDURE — 84484 ASSAY OF TROPONIN QUANT: CPT | Mod: ER

## 2023-12-08 PROCEDURE — 99284 EMERGENCY DEPT VISIT MOD MDM: CPT | Mod: 25,ER

## 2023-12-08 PROCEDURE — 25000003 PHARM REV CODE 250: Mod: ER | Performed by: EMERGENCY MEDICINE

## 2023-12-08 PROCEDURE — 85025 COMPLETE CBC W/AUTO DIFF WBC: CPT | Mod: ER

## 2023-12-08 RX ORDER — VITAMIN A 3000 MCG
1 CAPSULE ORAL
Qty: 30 ML | Refills: 0 | Status: SHIPPED | OUTPATIENT
Start: 2023-12-08

## 2023-12-08 RX ORDER — FLUTICASONE PROPIONATE 50 MCG
1 SPRAY, SUSPENSION (ML) NASAL 2 TIMES DAILY
Qty: 16 G | Refills: 0 | Status: SHIPPED | OUTPATIENT
Start: 2023-12-08

## 2023-12-08 RX ORDER — NAPROXEN SODIUM 220 MG/1
81 TABLET, FILM COATED ORAL DAILY
Qty: 30 TABLET | Refills: 0 | Status: SHIPPED | OUTPATIENT
Start: 2023-12-08 | End: 2024-12-07

## 2023-12-08 RX ORDER — IBUPROFEN 600 MG/1
600 TABLET ORAL EVERY 6 HOURS PRN
Qty: 20 TABLET | Refills: 0 | Status: SHIPPED | OUTPATIENT
Start: 2023-12-08

## 2023-12-08 RX ORDER — ACETAMINOPHEN 500 MG
500 TABLET ORAL EVERY 6 HOURS PRN
Qty: 30 TABLET | Refills: 0 | Status: SHIPPED | OUTPATIENT
Start: 2023-12-08

## 2023-12-08 RX ORDER — LEVOCETIRIZINE DIHYDROCHLORIDE 5 MG/1
5 TABLET, FILM COATED ORAL NIGHTLY
Qty: 30 TABLET | Refills: 0 | Status: SHIPPED | OUTPATIENT
Start: 2023-12-08 | End: 2024-12-07

## 2023-12-08 RX ORDER — ASPIRIN 325 MG
325 TABLET ORAL
Status: COMPLETED | OUTPATIENT
Start: 2023-12-08 | End: 2023-12-08

## 2023-12-08 RX ADMIN — ASPIRIN 325 MG ORAL TABLET 325 MG: 325 PILL ORAL at 03:12

## 2023-12-08 NOTE — Clinical Note
"Kriss Swan"Antonio was seen and treated in our emergency department on 12/8/2023.  She may return to work on 12/10/2023.       If you have any questions or concerns, please don't hesitate to call.      Sobeida Leiva, DO"

## 2023-12-09 NOTE — ED PROVIDER NOTES
"Encounter Date: 12/8/2023    SCRIBE #1 NOTE: I, Felix Warner, am scribing for, and in the presence of,  Sobeida Leiva DO. I have scribed the following portions of the note - Other sections scribed: HPI, ROS, PE, MDM.       History     Chief Complaint   Patient presents with    Chest Pain     A 37 y/o female presents to the ER c/o Substernal CP x 1 day. Denies N/V, SOB. No home treatments.      Kriss Alvarez is a 38 y.o. female with Hx of HTN who presents to the ED for chief complaint of 5/10, intermittent, non radiating, midsternal CP that started yesterday. Patient reports that the pain is "aching" in characteristics. Reports sitting down when the symptoms began. She is currently denying any chest pain at this time. Denies any trauma. Denies a hx of similar symptoms of chest pain prior. Denies any strenuous activities during these episodes. No known medications taken PTA. Endorses compliance with antihypertensives. Denies any alleviating or exacerbating factors. She denies a social hx of smoking, EtOH, or illicit drug use. Patient denies fever, chills, nausea, vomiting, diarrhea or any associated symptoms. NKDA.       The history is provided by the patient. No  was used.     Review of patient's allergies indicates:  No Known Allergies  Past Medical History:   Diagnosis Date    Hypertension      No past surgical history on file.  No family history on file.  Social History     Tobacco Use    Smoking status: Never     Review of Systems   Constitutional:  Negative for chills and fever.   HENT:  Negative for sore throat.    Respiratory:  Negative for shortness of breath.    Cardiovascular:  Positive for chest pain.   Gastrointestinal:  Negative for diarrhea, nausea and vomiting.   Genitourinary:  Negative for dysuria.   Musculoskeletal:  Negative for myalgias.   Skin:  Negative for rash.   Neurological:  Negative for syncope.   Psychiatric/Behavioral:  Negative for confusion.    All other systems " reviewed and are negative.      Physical Exam     Initial Vitals [12/08/23 1436]   BP Pulse Resp Temp SpO2   (!) 148/77 109 18 98.4 °F (36.9 °C) 100 %      MAP       --           Patient gave consent to have physical exam performed.    Physical Exam    Nursing note and vitals reviewed.  Constitutional: She appears well-developed and well-nourished. No distress.   HENT:   Head: Normocephalic and atraumatic.   Right Ear: External ear normal.   Left Ear: External ear normal.   Nose: Mucosal edema and rhinorrhea present.   Post nasal drip.    Eyes: EOM are normal. Pupils are equal, round, and reactive to light. Right eye exhibits no discharge. Left eye exhibits no discharge. No scleral icterus.   Neck: Neck supple.   Normal range of motion.  Cardiovascular:  Normal rate, regular rhythm, normal heart sounds and intact distal pulses.     Exam reveals no gallop and no friction rub.       No murmur heard.  Pulmonary/Chest: Breath sounds normal. No respiratory distress. She has no wheezes. She has no rhonchi. She has no rales. She exhibits no tenderness.   Musculoskeletal:         General: No tenderness or edema. Normal range of motion.      Cervical back: Normal range of motion and neck supple.     Neurological: She is alert and oriented to person, place, and time.   Skin: Skin is warm and dry. Capillary refill takes less than 2 seconds.   Psychiatric: She has a normal mood and affect. Her behavior is normal.         ED Course   Procedures  Labs Reviewed   POCT URINALYSIS W/O SCOPE - Abnormal; Notable for the following components:       Result Value    Spec Grav UA >=1.030 (*)     Blood, UA 1+ (*)     All other components within normal limits   POCT CMP - Abnormal; Notable for the following components:    POC Creatinine 0.5 (*)     Protein, POC 8.7 (*)     All other components within normal limits   TROPONIN ISTAT   POCT CBC   POCT URINE PREGNANCY   POCT URINALYSIS(INSTRUMENT)   POCT CMP   POCT PROTIME-INR   POCT TROPONIN    ISTAT PROCEDURE          Imaging Results              X-Ray Chest AP Portable (Final result)  Result time 12/08/23 15:35:09      Final result by Lazaro Guzman III, MD (12/08/23 15:35:09)                   Impression:      No acute process seen.      Electronically signed by: Lazaro Guzman MD  Date:    12/08/2023  Time:    15:35               Narrative:    EXAMINATION:  XR CHEST AP PORTABLE    CLINICAL HISTORY:  Chest Pain;    FINDINGS:  Chest one view: Heart size is normal.  Lungs are clear.  The bones are noncontributory.                                       Medications   aspirin tablet 325 mg (325 mg Oral Given 12/8/23 1513)     Medical Decision Making  Amount and/or Complexity of Data Reviewed  Labs: ordered.  Radiology: ordered.    Risk  OTC drugs.  Prescription drug management.    Medical Decision Making:    This is an evaluation of a 38 y.o. female that presents to the Emergency Department for   Chief Complaint   Patient presents with    Chest Pain     A 37 y/o female presents to the ER c/o Substernal CP x 1 day. Denies N/V, SOB. No home treatments.        Independent historian: Patient.     The patient is a non-toxic and well appearing patient. On physical exam, patient appears well hydrated with moist mucus membranes. Breath sounds are clear and equal bilaterally with no adventitious breath sounds, tachypnea or respiratory distress. Regular rate and rhythm. No murmurs. Abdomen soft and non tender. Patient is tolerating PO without difficulty. Physical exam otherwise as above.     I have reviewed vital signs and nursing notes.   Vital Signs Are Reassuring.     Based on the patient's symptoms, I am considering and evaluating for the following differential diagnoses: STEMI, NSTEMI, cardiac arrhythmia, pneumothorax, PNA and seasonal allergies.     Consider hospitalization for: chest pain    Patient is agreeable to transfer and admission to [facility]    ED Course:Treatment in the ED included Physical Exam and  medications given in ED  Medications   aspirin tablet 325 mg (325 mg Oral Given 12/8/23 1513)   .   Patient reports feeling better after treatment in the ER.       External Data/Documents Reviewed: Previous medical records and vital signs reviewed, see HPI and Physical exam.   Labs: ordered and reviewed.  Troponin is within normal limits at 0.00.  Radiology: ordered as indicated and reviewed.  No pneumothorax appreciated  ECG/medicine tests: ordered and independent interpretation performed by Dr. Sobeida Leiva DO. Decision-making details documented in ED Course.   Cardiac monitor placed for CP. Monitor shows Normal Sinus Rhythm. Interpreted by Dr. Sobeida Leiva DO.  Patient presents with chest pain for greater than 24 hours.  Troponin is negative.  No STEMI on EKG and no acute issues on chest x-ray. Chest pain unlikely to be cardiac in origin.  I recommend follow up with Cardiology in 1 day for further evaluation of chest pain. Discussed need to return to the ED if chest pain worsens or does not resolve.    Risk  Diagnosis or treatment significantly limited by the following social determinants of health: Body mass index is 53.22 kg/m².     In shared decision making with the patient, we discussed treatment, prescriptions, labs, and imaging results.    Discharge home with   ED Prescriptions       Medication Sig Dispense Start Date End Date Auth. Provider    acetaminophen (TYLENOL) 500 MG tablet Take 1 tablet (500 mg total) by mouth every 6 (six) hours as needed for Pain (As needed for pain). 30 tablet 12/8/2023 -- Sobeida Leiva DO    ibuprofen (ADVIL,MOTRIN) 600 MG tablet Take 1 tablet (600 mg total) by mouth every 6 (six) hours as needed for Pain (Take with food as needed for mild-to-moderate pain). 20 tablet 12/8/2023 -- Sobeida Leiva DO    sodium chloride (SALINE NASAL) 0.65 % nasal spray 1 spray by Nasal route as needed for Congestion. 30 mL 12/8/2023 -- Sobeida Leiva DO    levocetirizine (XYZAL) 5 MG tablet Take 1  tablet (5 mg total) by mouth every evening. 30 tablet 12/8/2023 12/7/2024 Sobeida Leiva DO    fluticasone propionate (FLONASE) 50 mcg/actuation nasal spray 1 spray (50 mcg total) by Each Nostril route 2 (two) times daily. 16 g 12/8/2023 -- Sobeida Leiva DO    aspirin 81 MG Chew Take 1 tablet (81 mg total) by mouth once daily. 30 tablet 12/8/2023 12/7/2024 Sobeida Leiva DO          Fill and take prescriptions as directed.  Return to the ED if symptoms worsen or do not resolve.   Answered questions and discussed discharge plan.    Patient feels better and is ready for discharge.  Follow up with PCP/specialist in 1 day    At time of discharge patient is awake alert oriented x4 speaking clearly in full sentences and moving all 4 extremities.     The following labs and imaging were reviewed:        Admission on 12/08/2023, Discharged on 12/08/2023   Component Date Value Ref Range Status    POC PTWBT 12/08/2023 12.1  9.7 - 14.3 sec Final    POC PTINR 12/08/2023 1.0  0.9 - 1.2 Final    Sample 12/08/2023 unknown   Final    POC Cardiac Troponin I 12/08/2023 0.00  0.00 - 0.08 ng/mL Final    Sample 12/08/2023 unknown   Final    Comment: A single negative troponin is insufficient to rule out myocardial infarction.  The use of a serial sampling protocol is recommended practice. Correlate results with reference intervals established for methodology used. Point of care and core laboratory   troponin results are not interchangeable.      Albumin, POC 12/08/2023 4.7  3.3 - 5.5 g/dL Final    Alkaline Phosphatase, POC 12/08/2023 65  42 - 141 U/L Final    ALT (SGPT), POC 12/08/2023 18  10 - 47 U/L Final    AST (SGOT), POC 12/08/2023 27  11 - 38 U/L Final    POC BUN 12/08/2023 12  7 - 22 mg/dL Final    Calcium, POC 12/08/2023 9.9  8.0 - 10.3 mg/dL Final    POC Chloride 12/08/2023 105  98 - 108 mmol/L Final    POC Creatinine 12/08/2023 0.5 (L)  0.6 - 1.2 mg/dL Final    POC Glucose 12/08/2023 110  73 - 118 mg/dL Final    POC Potassium  12/08/2023 5.1  3.6 - 5.1 mmol/L Final    POC Sodium 12/08/2023 138  128 - 145 mmol/L Final    Bilirubin, POC 12/08/2023 0.4  0.2 - 1.6 mg/dL Final    POC TCO2 12/08/2023 27  18 - 33 mmol/L Final    Protein, POC 12/08/2023 8.7 (H)  6.4 - 8.1 g/dL Final    POC Preg Test, Ur 12/08/2023 Negative  Negative Final     Acceptable 12/08/2023 Yes   Final    Glucose, UA 12/08/2023 Negative   Final    Bilirubin, UA 12/08/2023 Negative   Final    Ketones, UA 12/08/2023 Negative   Final    Spec Grav UA 12/08/2023 >=1.030 (>)   Final    Blood, UA 12/08/2023 1+ (A)   Final    PH, UA 12/08/2023 6.0   Final    Protein, UA 12/08/2023 Negative   Final    Urobilinogen, UA 12/08/2023 0.2  E.U./dL Final    Nitrite, UA 12/08/2023 Negative   Final    Leukocytes, UA 12/08/2023 Negative   Final    Color, UA 12/08/2023 Yellow   Final    Clarity, UA 12/08/2023 Clear   Final        Imaging Results              X-Ray Chest AP Portable (Final result)  Result time 12/08/23 15:35:09      Final result by Lazaro Guzman III, MD (12/08/23 15:35:09)                   Impression:      No acute process seen.      Electronically signed by: Lazaro Guzman MD  Date:    12/08/2023  Time:    15:35               Narrative:    EXAMINATION:  XR CHEST AP PORTABLE    CLINICAL HISTORY:  Chest Pain;    FINDINGS:  Chest one view: Heart size is normal.  Lungs are clear.  The bones are noncontributory.                                               ED Course as of 12/08/23 1837   Fri Dec 08, 2023   1527 EKG read independently read by Dr. Leiva. No STEMI. Rate of 103. Sinus Tachycardia. L axis deviation. Abnormal EKG. QTc normal at 466. When compared to prior EKG dated 10/25/22 rate decreased by 37 bpm.    [JN]      ED Course User Index  [JN] Felix Warner, Dr. Sobeida Leiva, personally performed the services described in this documentation. All medical record entries made by the scribe were at my direction and in my  presence. I have reviewed the chart and agree that the record reflects my personal performance and is accurate and complete.            Clinical Impression:  Final diagnoses:  [R07.9] Chest pain  [J30.2] Seasonal allergies (Primary)          ED Disposition Condition    Discharge Stable          ED Prescriptions       Medication Sig Dispense Start Date End Date Auth. Provider    acetaminophen (TYLENOL) 500 MG tablet Take 1 tablet (500 mg total) by mouth every 6 (six) hours as needed for Pain (As needed for pain). 30 tablet 12/8/2023 -- Sobeida Leiva DO    ibuprofen (ADVIL,MOTRIN) 600 MG tablet Take 1 tablet (600 mg total) by mouth every 6 (six) hours as needed for Pain (Take with food as needed for mild-to-moderate pain). 20 tablet 12/8/2023 -- Sobeida Leiva DO    sodium chloride (SALINE NASAL) 0.65 % nasal spray 1 spray by Nasal route as needed for Congestion. 30 mL 12/8/2023 -- Sobeida Leiva DO    levocetirizine (XYZAL) 5 MG tablet Take 1 tablet (5 mg total) by mouth every evening. 30 tablet 12/8/2023 12/7/2024 Sobeida Leiva DO    fluticasone propionate (FLONASE) 50 mcg/actuation nasal spray 1 spray (50 mcg total) by Each Nostril route 2 (two) times daily. 16 g 12/8/2023 -- Sobeida Leiva DO    aspirin 81 MG Chew Take 1 tablet (81 mg total) by mouth once daily. 30 tablet 12/8/2023 12/7/2024 Sobeida Leiva DO          Follow-up Information       Follow up With Specialties Details Why Contact Info    Chai Arteaga MD Cardiology Schedule an appointment as soon as possible for a visit   [address] [facility]  [place] [address]  [phone]      [facility] - Emergency Dept Emergency Medicine Go to  Please go to [facility] emergency department if symptoms worsen [address]  [facility] [place] [address] [phone]             Sobeida Leiva DO  12/08/23 0467

## 2023-12-19 ENCOUNTER — OFFICE VISIT (OUTPATIENT)
Dept: CARDIOLOGY | Facility: CLINIC | Age: 38
End: 2023-12-19
Payer: MEDICARE

## 2023-12-19 VITALS
HEIGHT: 68 IN | HEART RATE: 103 BPM | DIASTOLIC BLOOD PRESSURE: 78 MMHG | OXYGEN SATURATION: 98 % | BODY MASS INDEX: 44.41 KG/M2 | SYSTOLIC BLOOD PRESSURE: 142 MMHG | RESPIRATION RATE: 15 BRPM | WEIGHT: 293 LBS

## 2023-12-19 DIAGNOSIS — I10 ESSENTIAL HYPERTENSION: ICD-10-CM

## 2023-12-19 DIAGNOSIS — R07.2 PRECORDIAL PAIN: Primary | ICD-10-CM

## 2023-12-19 DIAGNOSIS — E66.01 MORBID OBESITY, UNSPECIFIED OBESITY TYPE: ICD-10-CM

## 2023-12-19 PROCEDURE — 3078F PR MOST RECENT DIASTOLIC BLOOD PRESSURE < 80 MM HG: ICD-10-PCS | Mod: CPTII,S$GLB,, | Performed by: INTERNAL MEDICINE

## 2023-12-19 PROCEDURE — 3077F PR MOST RECENT SYSTOLIC BLOOD PRESSURE >= 140 MM HG: ICD-10-PCS | Mod: CPTII,S$GLB,, | Performed by: INTERNAL MEDICINE

## 2023-12-19 PROCEDURE — 1159F PR MEDICATION LIST DOCUMENTED IN MEDICAL RECORD: ICD-10-PCS | Mod: CPTII,S$GLB,, | Performed by: INTERNAL MEDICINE

## 2023-12-19 PROCEDURE — 3008F BODY MASS INDEX DOCD: CPT | Mod: CPTII,S$GLB,, | Performed by: INTERNAL MEDICINE

## 2023-12-19 PROCEDURE — 3008F PR BODY MASS INDEX (BMI) DOCUMENTED: ICD-10-PCS | Mod: CPTII,S$GLB,, | Performed by: INTERNAL MEDICINE

## 2023-12-19 PROCEDURE — 99204 OFFICE O/P NEW MOD 45 MIN: CPT | Mod: S$GLB,,, | Performed by: INTERNAL MEDICINE

## 2023-12-19 PROCEDURE — 3078F DIAST BP <80 MM HG: CPT | Mod: CPTII,S$GLB,, | Performed by: INTERNAL MEDICINE

## 2023-12-19 PROCEDURE — 1160F PR REVIEW ALL MEDS BY PRESCRIBER/CLIN PHARMACIST DOCUMENTED: ICD-10-PCS | Mod: CPTII,S$GLB,, | Performed by: INTERNAL MEDICINE

## 2023-12-19 PROCEDURE — 1160F RVW MEDS BY RX/DR IN RCRD: CPT | Mod: CPTII,S$GLB,, | Performed by: INTERNAL MEDICINE

## 2023-12-19 PROCEDURE — 99204 PR OFFICE/OUTPT VISIT, NEW, LEVL IV, 45-59 MIN: ICD-10-PCS | Mod: S$GLB,,, | Performed by: INTERNAL MEDICINE

## 2023-12-19 PROCEDURE — 99999 PR PBB SHADOW E&M-EST. PATIENT-LVL III: CPT | Mod: PBBFAC,,, | Performed by: INTERNAL MEDICINE

## 2023-12-19 PROCEDURE — 1159F MED LIST DOCD IN RCRD: CPT | Mod: CPTII,S$GLB,, | Performed by: INTERNAL MEDICINE

## 2023-12-19 PROCEDURE — 99999 PR PBB SHADOW E&M-EST. PATIENT-LVL III: ICD-10-PCS | Mod: PBBFAC,,, | Performed by: INTERNAL MEDICINE

## 2023-12-19 PROCEDURE — 3077F SYST BP >= 140 MM HG: CPT | Mod: CPTII,S$GLB,, | Performed by: INTERNAL MEDICINE

## 2023-12-19 NOTE — PROGRESS NOTES
CARDIOVASCULAR CONSULTATION    REASON FOR CONSULT:   Kriss Alvarez is a 38 y.o. female who presents for CP.    Req/ER: Foster  HISTORY OF PRESENT ILLNESS:   The patient is a very pleasant 38-year-old woman presenting at the request of the [facility] with a complaint of chest pain.  She describes a stabbing left parasternal discomfort which would last for minutes at a time and resolves spontaneously.  There was no associated symptoms such as nausea diaphoresis or shortness of breath.  She denies any exertional symptoms.  There has been no palpitations or syncope.  She denies PND, orthopnea, melena, hematuria, or claudication symptoms.  She is accompanied by her  to today's visit.      The patient tells me she has no longer taking labetalol and I removed his medication list.  She thinks she is on amlodipine but can not tell me the dose.    Family history appears to be negative for premature CAD amongst first-degree relatives.      The patient denies tobacco use, alcohol excess, or illicit drug use.    CARDIOVASCULAR HISTORY:   none    PAST MEDICAL HISTORY:     Past Medical History:   Diagnosis Date    Hypertension        PAST SURGICAL HISTORY:   History reviewed. No pertinent surgical history.    ALLERGIES AND MEDICATION:   Review of patient's allergies indicates:  No Known Allergies     Medication List            Accurate as of December 19, 2023  3:37 PM. If you have any questions, ask your nurse or doctor.                CONTINUE taking these medications      acetaminophen 500 MG tablet  Commonly known as: TYLENOL  Take 1 tablet (500 mg total) by mouth every 6 (six) hours as needed for Pain (As needed for pain).     aspirin 81 MG Chew  Take 1 tablet (81 mg total) by mouth once daily.     fluticasone propionate 50 mcg/actuation nasal spray  Commonly known as: FLONASE  1 spray (50 mcg total) by Each Nostril route 2 (two) times daily.     ibuprofen 600 MG tablet  Commonly known as:  ADVIL,MOTRIN  Take 1 tablet (600 mg total) by mouth every 6 (six) hours as needed for Pain (Take with food as needed for mild-to-moderate pain).     levocetirizine 5 MG tablet  Commonly known as: XYZAL  Take 1 tablet (5 mg total) by mouth every evening.     PRENATAL-1 ORAL     Saline NasaL 0.65 % nasal spray  Generic drug: sodium chloride  1 spray by Nasal route as needed for Congestion.            STOP taking these medications      labetaloL 200 MG tablet  Commonly known as: NORMODYNE  Stopped by: Erick Edwards MD              SOCIAL HISTORY:     Social History     Socioeconomic History    Marital status: Single   Tobacco Use    Smoking status: Never     Social Determinants of Health     Financial Resource Strain: Low Risk  (10/26/2022)    Overall Financial Resource Strain (CARDIA)     Difficulty of Paying Living Expenses: Not hard at all   Food Insecurity: No Food Insecurity (10/26/2022)    Hunger Vital Sign     Worried About Running Out of Food in the Last Year: Never true     Ran Out of Food in the Last Year: Never true   Transportation Needs: No Transportation Needs (10/26/2022)    PRAPARE - Transportation     Lack of Transportation (Medical): No     Lack of Transportation (Non-Medical): No   Physical Activity: Inactive (10/26/2022)    Exercise Vital Sign     Days of Exercise per Week: 0 days     Minutes of Exercise per Session: 0 min   Stress: No Stress Concern Present (10/26/2022)    [employer] - Occupational Stress Questionnaire     Feeling of Stress : Only a little   Social Connections: Socially Isolated (10/26/2022)    Social Connection and Isolation Panel [NHANES]     Frequency of Communication with Friends and Family: More than three times a week     Frequency of Social Gatherings with Friends and Family: Once a week     Attends Bahai Services: Never     Active Member of Clubs or Organizations: No     Attends Club or Organization Meetings: Never     Marital Status:  "Never    Housing Stability: Low Risk  (10/26/2022)    Housing Stability Vital Sign     Unable to Pay for Housing in the Last Year: No     Number of Places Lived in the Last Year: 1     Unstable Housing in the Last Year: No       FAMILY HISTORY:   History reviewed. No pertinent family history.    REVIEW OF SYSTEMS:   Review of Systems   Constitutional:  Negative for chills, diaphoresis and fever.   HENT:  Negative for nosebleeds.    Eyes:  Negative for blurred vision, double vision and photophobia.   Respiratory:  Negative for hemoptysis, shortness of breath and wheezing.    Cardiovascular:  Positive for chest pain. Negative for palpitations, orthopnea, claudication, leg swelling and PND.   Gastrointestinal:  Negative for abdominal pain, blood in stool, heartburn, melena, nausea and vomiting.   Genitourinary:  Negative for flank pain and hematuria.   Musculoskeletal:  Negative for falls, myalgias and neck pain.   Skin:  Negative for rash.   Neurological:  Negative for dizziness, seizures, loss of consciousness, weakness and headaches.   Endo/Heme/Allergies:  Negative for polydipsia. Does not bruise/bleed easily.   Psychiatric/Behavioral:  Negative for depression and memory loss. The patient is not nervous/anxious.        PHYSICAL EXAM:     Vitals:    12/19/23 1511   BP: (!) 142/78   Pulse: 103   Resp: 15    Body mass index is 52.29 kg/m².  Weight: (!) 156 kg (343 lb 14.7 oz)   Height: 5' 8" (172.7 cm)     Physical Exam  Vitals reviewed.   Constitutional:       General: She is not in acute distress.     Appearance: She is well-developed. She is obese. She is not ill-appearing, toxic-appearing or diaphoretic.   HENT:      Head: Normocephalic and atraumatic.   Eyes:      General: No scleral icterus.     Extraocular Movements: Extraocular movements intact.      Conjunctiva/sclera: Conjunctivae normal.      Pupils: Pupils are equal, round, and reactive to light.   Neck:      Thyroid: No thyromegaly.      Vascular: " Normal carotid pulses. No carotid bruit or JVD.      Trachea: Trachea normal.   Cardiovascular:      Rate and Rhythm: Normal rate and regular rhythm.      Pulses:           Carotid pulses are 2+ on the right side and 2+ on the left side.     Heart sounds: S1 normal and S2 normal. Heart sounds are distant. No murmur heard.     No friction rub. No gallop.   Pulmonary:      Effort: Pulmonary effort is normal. No respiratory distress.      Breath sounds: Normal breath sounds. No stridor. No wheezing, rhonchi or rales.   Chest:      Chest wall: No tenderness.   Abdominal:      General: There is no distension.      Palpations: Abdomen is soft.   Musculoskeletal:         General: No swelling or tenderness. Normal range of motion.      Cervical back: Normal range of motion and neck supple. No edema or rigidity.      Right lower leg: No edema.      Left lower leg: No edema.   Feet:      Right foot:      Skin integrity: No ulcer.      Left foot:      Skin integrity: No ulcer.   Skin:     General: Skin is warm and dry.      Coloration: Skin is not jaundiced.   Neurological:      General: No focal deficit present.      Mental Status: She is alert and oriented to person, place, and time.      Cranial Nerves: No cranial nerve deficit.   Psychiatric:         Mood and Affect: Mood normal.         Speech: Speech normal.         Behavior: Behavior normal. Behavior is cooperative.         DATA:   EKG: (personally reviewed tracing)  12/8/23 , [facility]    Laboratory:  CBC:  Recent Labs   Lab 01/31/22  1604 10/26/22  0542   WBC 7.75 12.07   Hemoglobin 11.6 L 9.6 L   Hematocrit 35.8 L 31.2 L   Platelets 309 243       CHEMISTRIES:  Recent Labs   Lab 01/31/22  1604 06/26/22  0935 08/15/22  1949 08/16/22  1755 08/19/22  0952 10/26/22  0542   Glucose 104  --   --   --   --  130 H   Sodium 136 136 136 132 L 136 136   Potassium 3.8 3.8 3.1 L 3.3 L 3.5 3.7   BUN 10 <5.0 L <5.0 L 5.8 L <5.0 L 9   Creatinine 0.7 0.54 L 0.47 L 0.53 L 0.47 L 0.8    eGFR if non  >60  --   --   --   --   --    eGFR  --  >105 >105 >105 >105 >60   Calcium 8.8 9.4 9.2 8.2 L 8.4 8.6 L   Magnesium  --   --   --   --   --  1.6       CARDIAC BIOMARKERS:        COAGS:  Recent Labs   Lab 12/08/23  1530   INR 1.0       LIPIDS/LFTS:  Recent Labs   Lab 08/15/22  1949 08/16/22  1755 08/19/22  0952   AST 11 11 14   ALT 8 L 7 L 11     Labs 12/08/2023:   ALT 18   AST 27   Creatinine 0.5   Potassium 5.1   INR 1.0   Troponin 0   Hemoglobin 12.8   Platelets 376    Lipid panel 12/02/2023:   Total cholesterol 193   Triglycerides 54   HDL 45       Cardiovascular Testing:  Ordered  ETT  Echo    ASSESSMENT:   # CP, atyp  # HTN, uncontrolled, ?on amlod ?dose  # BMI 52    PLAN:   ETT  Echo  Digital HTN program ordered  RTC 1 month with pill bottle (Jan 2024)    Erick Edwards MD, FACC

## 2023-12-21 ENCOUNTER — HOSPITAL ENCOUNTER (OUTPATIENT)
Dept: CARDIOLOGY | Facility: HOSPITAL | Age: 38
Discharge: HOME | End: 2023-12-21
Attending: INTERNAL MEDICINE
Payer: MEDICARE

## 2023-12-21 DIAGNOSIS — R07.2 PRECORDIAL PAIN: ICD-10-CM

## 2023-12-21 DIAGNOSIS — I10 ESSENTIAL HYPERTENSION: ICD-10-CM

## 2023-12-21 LAB
ASCENDING AORTA: 3.03 CM
AV INDEX (PROSTH): 0.89
AV MEAN GRADIENT: 5 MMHG
AV PEAK GRADIENT: 10 MMHG
AV VALVE AREA BY VELOCITY RATIO: 2.79 CM²
AV VALVE AREA: 3.12 CM²
AV VELOCITY RATIO: 0.8
CV ECHO LV RWT: 0.4 CM
CV STRESS BASE HR: 108 BPM
DIASTOLIC BLOOD PRESSURE: 94 MMHG
DOP CALC AO PEAK VEL: 1.59 M/S
DOP CALC AO VTI: 25.9 CM
DOP CALC LVOT AREA: 3.5 CM2
DOP CALC LVOT DIAMETER: 2.11 CM
DOP CALC LVOT PEAK VEL: 1.27 M/S
DOP CALC LVOT STROKE VOLUME: 80.73 CM3
DOP CALCLVOT PEAK VEL VTI: 23.1 CM
E WAVE DECELERATION TIME: 92.45 MSEC
E/A RATIO: 1.01
E/E' RATIO: 7.55 M/S
ECHO LV POSTERIOR WALL: 1.02 CM (ref 0.6–1.1)
FRACTIONAL SHORTENING: 30 % (ref 28–44)
INTERVENTRICULAR SEPTUM: 1.04 CM (ref 0.6–1.1)
IVC DIAMETER: 1.52 CM
IVRT: 114.18 MSEC
LA MAJOR: 6.06 CM
LA MINOR: 6.2 CM
LA WIDTH: 4.2 CM
LEFT ATRIUM SIZE: 3.05 CM
LEFT ATRIUM VOLUME: 66.74 CM3
LEFT INTERNAL DIMENSION IN SYSTOLE: 3.61 CM (ref 2.1–4)
LEFT VENTRICLE DIASTOLIC VOLUME: 125.99 ML
LEFT VENTRICLE SYSTOLIC VOLUME: 54.91 ML
LEFT VENTRICULAR INTERNAL DIMENSION IN DIASTOLE: 5.14 CM (ref 3.5–6)
LEFT VENTRICULAR MASS: 198.16 G
LV LATERAL E/E' RATIO: 5.93 M/S
LV SEPTAL E/E' RATIO: 10.38 M/S
LVOT MG: 3.97 MMHG
LVOT MV: 0.95 CM/S
MV PEAK A VEL: 0.82 M/S
MV PEAK E VEL: 0.83 M/S
MV STENOSIS PRESSURE HALF TIME: 26.81 MS
MV VALVE AREA P 1/2 METHOD: 8.21 CM2
OHS CV CPX 1 MINUTE RECOVERY HEART RATE: 133 BPM
OHS CV CPX 85 PERCENT MAX PREDICTED HEART RATE MALE: 155
OHS CV CPX ESTIMATED METS: 5
OHS CV CPX MAX PREDICTED HEART RATE: 182
OHS CV CPX PATIENT IS FEMALE: 1
OHS CV CPX PATIENT IS MALE: 0
OHS CV CPX PEAK DIASTOLIC BLOOD PRESSURE: 102 MMHG
OHS CV CPX PEAK HEAR RATE: 171 BPM
OHS CV CPX PEAK RATE PRESSURE PRODUCT: NORMAL
OHS CV CPX PEAK SYSTOLIC BLOOD PRESSURE: 182 MMHG
OHS CV CPX PERCENT MAX PREDICTED HEART RATE ACHIEVED: 99
OHS CV CPX RATE PRESSURE PRODUCT PRESENTING: NORMAL
PISA TR MAX VEL: 1.36 M/S
PV PEAK GRADIENT: 8 MMHG
PV PEAK VELOCITY: 1.43 M/S
RA MAJOR: 5.16 CM
RA PRESSURE ESTIMATED: 3 MMHG
RA WIDTH: 3.8 CM
RIGHT VENTRICULAR END-DIASTOLIC DIMENSION: 4.19 CM
RV TB RVSP: 4 MMHG
RV TISSUE DOPPLER FREE WALL SYSTOLIC VELOCITY 1 (APICAL 4 CHAMBER VIEW): 16.14 CM/S
SINUS: 3.14 CM
STRESS ECHO POST EXERCISE DUR MIN: 3 MINUTES
STRESS ECHO POST EXERCISE DUR SEC: 32 SECONDS
SYSTOLIC BLOOD PRESSURE: 152 MMHG
TDI LATERAL: 0.14 M/S
TDI SEPTAL: 0.08 M/S
TDI: 0.11 M/S
TR MAX PG: 7 MMHG
TRICUSPID ANNULAR PLANE SYSTOLIC EXCURSION: 2.72 CM
TV REST PULMONARY ARTERY PRESSURE: 10 MMHG

## 2023-12-21 PROCEDURE — 93018 EXERCISE STRESS - EKG (CUPID ONLY): ICD-10-PCS | Mod: ,,, | Performed by: INTERNAL MEDICINE

## 2023-12-21 PROCEDURE — 93017 CV STRESS TEST TRACING ONLY: CPT

## 2023-12-21 PROCEDURE — 93018 CV STRESS TEST I&R ONLY: CPT | Mod: ,,, | Performed by: INTERNAL MEDICINE

## 2023-12-21 PROCEDURE — 93306 ECHO (CUPID ONLY): ICD-10-PCS | Mod: 26,,, | Performed by: INTERNAL MEDICINE

## 2023-12-21 PROCEDURE — 93306 TTE W/DOPPLER COMPLETE: CPT | Mod: 26,,, | Performed by: INTERNAL MEDICINE

## 2023-12-21 PROCEDURE — 93306 TTE W/DOPPLER COMPLETE: CPT

## 2023-12-21 PROCEDURE — 93016 EXERCISE STRESS - EKG (CUPID ONLY): ICD-10-PCS | Mod: ,,, | Performed by: INTERNAL MEDICINE

## 2023-12-21 PROCEDURE — 93016 CV STRESS TEST SUPVJ ONLY: CPT | Mod: ,,, | Performed by: INTERNAL MEDICINE

## 2024-08-23 ENCOUNTER — HOSPITAL ENCOUNTER (EMERGENCY)
Facility: HOSPITAL | Age: 39
Discharge: HOME | End: 2024-08-23
Attending: EMERGENCY MEDICINE
Payer: MEDICARE

## 2024-08-23 VITALS
HEIGHT: 68 IN | HEART RATE: 100 BPM | OXYGEN SATURATION: 99 % | TEMPERATURE: 99 F | WEIGHT: 293 LBS | DIASTOLIC BLOOD PRESSURE: 90 MMHG | SYSTOLIC BLOOD PRESSURE: 155 MMHG | BODY MASS INDEX: 44.41 KG/M2 | RESPIRATION RATE: 19 BRPM

## 2024-08-23 DIAGNOSIS — J02.0 STREP PHARYNGITIS: Primary | ICD-10-CM

## 2024-08-23 LAB
B-HCG UR QL: NEGATIVE
CTP QC/QA: YES
CTP QC/QA: YES
INFLUENZA A ANTIGEN, POC: NEGATIVE
INFLUENZA B ANTIGEN, POC: NEGATIVE
POC RAPID STREP A: POSITIVE
SARS-COV-2 RDRP RESP QL NAA+PROBE: NEGATIVE

## 2024-08-23 PROCEDURE — 87880 STREP A ASSAY W/OPTIC: CPT | Mod: ER

## 2024-08-23 PROCEDURE — 99283 EMERGENCY DEPT VISIT LOW MDM: CPT | Mod: 25,ER

## 2024-08-23 PROCEDURE — 81025 URINE PREGNANCY TEST: CPT | Mod: ER

## 2024-08-23 PROCEDURE — 81025 URINE PREGNANCY TEST: CPT | Mod: ER | Performed by: EMERGENCY MEDICINE

## 2024-08-23 PROCEDURE — 87804 INFLUENZA ASSAY W/OPTIC: CPT | Mod: ER

## 2024-08-23 PROCEDURE — 87635 SARS-COV-2 COVID-19 AMP PRB: CPT | Mod: ER | Performed by: EMERGENCY MEDICINE

## 2024-08-23 RX ORDER — AMOXICILLIN 500 MG/1
500 TABLET, FILM COATED ORAL EVERY 12 HOURS
Qty: 20 TABLET | Refills: 0 | Status: SHIPPED | OUTPATIENT
Start: 2024-08-23 | End: 2024-09-02

## 2024-08-23 NOTE — ED PROVIDER NOTES
Encounter Date: 8/23/2024    SCRIBE #1 NOTE: I, Kenisha Ambrose, am scribing for, and in the presence of,  Gretchen Jordan M. I have scribed the following portions of the note - Other sections scribed: HPI,ROS,PE.       History     Chief Complaint   Patient presents with    Otalgia     Blat ear pain, onset 2 days    Cough    Sore Throat     Kriss Alvarez is a 38 y.o. female, with a PMHx of HTN, who presents to the ED with ear pain onset 1 week ago. Patient reports associated symptoms of sore throat and cough. Patient states that her ear pain is worse when she lays down and states that her throat pain worsens when she touches it. Patient denies taking any medications for her symptoms. Patient reports compliance with her antihypertensive medications. No other exacerbating or alleviating factors. Denies fever or other associated symptoms.      The history is provided by the patient. No  was used.     Review of patient's allergies indicates:  No Known Allergies  Past Medical History:   Diagnosis Date    Hypertension      No past surgical history on file.  No family history on file.  Social History     Tobacco Use    Smoking status: Never     Review of Systems   Constitutional:  Negative for chills and fever.   HENT:  Positive for ear pain and sore throat. Negative for congestion.    Eyes:  Negative for pain.   Respiratory:  Positive for cough. Negative for shortness of breath and wheezing.    Cardiovascular:  Negative for chest pain.   Gastrointestinal:  Negative for abdominal pain.   Genitourinary:  Negative for flank pain.   Musculoskeletal:  Negative for myalgias.   Skin:  Negative for color change.   Neurological:  Negative for weakness and headaches.   Hematological:  Does not bruise/bleed easily.   Psychiatric/Behavioral:  Negative for suicidal ideas.    All other systems reviewed and are negative.      Physical Exam     Initial Vitals [08/23/24 0817]   BP Pulse Resp Temp SpO2   (!) 165/98  106 20 98.8 °F (37.1 °C) 97 %      MAP       --         Physical Exam    Nursing note and vitals reviewed.  Constitutional: She appears well-developed and well-nourished. No distress.   HENT:   Head: Normocephalic and atraumatic.   Right Ear: Tympanic membrane normal.   Left Ear: Tympanic membrane normal.   Mouth/Throat: Posterior oropharyngeal erythema present. No posterior oropharyngeal edema.   Mild tonsillar edema   Eyes: Conjunctivae and EOM are normal. Pupils are equal, round, and reactive to light. Right eye exhibits no discharge. Left eye exhibits no discharge.   Neck: Neck supple.   Normal range of motion.  Cardiovascular:  Normal rate, regular rhythm, normal heart sounds and normal pulses.     Exam reveals no decreased pulses.       No murmur heard.  Pulmonary/Chest: Effort normal and breath sounds normal. No respiratory distress. She has no wheezes. She has no rhonchi. She has no rales.   Abdominal: Abdomen is soft. She exhibits no distension.   Musculoskeletal:         General: No tenderness. Normal range of motion.      Cervical back: Normal range of motion and neck supple.     Neurological: She is alert.   Skin: Skin is warm and dry.   Psychiatric: She has a normal mood and affect.         ED Course   Procedures  Labs Reviewed   POCT STREP A, RAPID - Abnormal       Result Value    POC Rapid Strep A positive (*)    POCT URINE PREGNANCY    POC Preg Test, Ur Negative       Acceptable Yes     SARS-COV-2 RDRP GENE    POC Rapid COVID Negative       Acceptable Yes      Narrative:     This test utilizes isothermal nucleic acid amplification technology to detect the SARS-CoV-2 RdRp nucleic acid segment. The analytical sensitivity (limit of detection) is 500 copies/swab.     A POSITIVE result is indicative of the presence of SARS-CoV-2 RNA; clinical correlation with patient history and other diagnostic information is necessary to determine patient infection status.    A NEGATIVE  result means that SARS-CoV-2 nucleic acids are not present above the limit of detection. A NEGATIVE result should be treated as presumptive. It does not rule out the possibility of COVID-19 and should not be the sole basis for treatment decisions. If COVID-19 is strongly suspected based on clinical and exposure history, re-testing using an alternate molecular assay should be considered.     Commercial kits are provided by [employer].   _________________________________________________________________   The authorized Fact Sheet for Healthcare Providers and the authorized Fact Sheet for Patients of the ID NOW COVID-19 are available on the FDA website:    https://www.fda.gov/media/926409/download      https://www.fda.gov/media/885960/download      POCT STREP A MOLECULAR   POCT INFLUENZA A/B MOLECULAR   POCT RAPID INFLUENZA A/B    Influenza B Ag negative      Inflenza A Ag negative            Imaging Results    None          Medications - No data to display  Medical Decision Making  This is an urgent evaluation of a 38-year-old woman who presented to the emergency department today for evaluation of sore throat and cough.  Differential diagnoses included COVID-19, influenza, strep pharyngitis, amongst others.  On physical examination, patient was in no acute distress.  Heart and lung sounds were within normal limits.  She had mild posterior oropharyngeal erythema without edema and mild tonsillar edema without exudates.  Rapid flu and COVID tests were both negative.  Patient was found to be positive for strep pharyngitis.  Will discharge patient home with amoxicillin as she stated she did not wish to have a Bicillin injection.  She was advised to alternate doses of Tylenol and ibuprofen for pain, was given return precautions, and advised follow-up with primary care.  All questions and concerns were addressed and patient was discharged home in stable condition.    Gretchen Jordan MD  10:21 AM  8/23/2024       Amount  and/or Complexity of Data Reviewed  External Data Reviewed: notes.  Labs: ordered.    Risk  Prescription drug management.            Scribe Attestation:   Scribe #1: I performed the above scribed service and the documentation accurately describes the services I performed. I attest to the accuracy of the note.              I, Gretchen Leong , personally performed the services described in this documentation. All medical record entries made by the scribe were at my direction and in my presence. I have reviewed the chart and agree that the record reflects my personal performance and is accurate and complete.      DISCLAIMER: This note was prepared with [employer] voice recognition transcription software. Garbled syntax, mangled pronouns, and other bizarre constructions may be attributed to that software system.                  Clinical Impression:  Final diagnoses:  [J02.0] Strep pharyngitis (Primary)          ED Disposition Condition    Discharge Stable          ED Prescriptions       Medication Sig Dispense Start Date End Date Auth. Provider    amoxicillin (AMOXIL) 500 MG Tab Take 1 tablet (500 mg total) by mouth every 12 (twelve) hours. for 10 days 20 tablet 8/23/2024 9/2/2024 Gretchen Leong MD          Follow-up Information       Follow up With Specialties Details Why Contact Info    [facility] -  Schedule an appointment as soon as possible for a visit   [address]  [place] [address]  [phone]               Gretchen Leong MD  08/23/24 7329

## 2025-03-26 ENCOUNTER — HOSPITAL ENCOUNTER (EMERGENCY)
Facility: HOSPITAL | Age: 40
Discharge: HOME | End: 2025-03-26
Attending: STUDENT IN AN ORGANIZED HEALTH CARE EDUCATION/TRAINING PROGRAM
Payer: MEDICARE

## 2025-03-26 VITALS
DIASTOLIC BLOOD PRESSURE: 94 MMHG | RESPIRATION RATE: 18 BRPM | TEMPERATURE: 99 F | BODY MASS INDEX: 47.09 KG/M2 | HEART RATE: 85 BPM | OXYGEN SATURATION: 100 % | WEIGHT: 293 LBS | HEIGHT: 66 IN | SYSTOLIC BLOOD PRESSURE: 145 MMHG

## 2025-03-26 DIAGNOSIS — R10.9 RIGHT SIDED ABDOMINAL PAIN: Primary | ICD-10-CM

## 2025-03-26 DIAGNOSIS — N30.01 ACUTE CYSTITIS WITH HEMATURIA: ICD-10-CM

## 2025-03-26 LAB
ALBUMIN SERPL-MCNC: 4.1 G/DL (ref 3.3–5.5)
ALBUMIN SERPL-MCNC: 4.2 G/DL (ref 3.3–5.5)
ALP SERPL-CCNC: 68 U/L (ref 42–141)
ALP SERPL-CCNC: 72 U/L (ref 42–141)
B-HCG UR QL: NEGATIVE
BILIRUB SERPL-MCNC: 0.5 MG/DL (ref 0.2–1.6)
BILIRUB SERPL-MCNC: 0.6 MG/DL (ref 0.2–1.6)
BILIRUBIN, POC UA: NEGATIVE
BLOOD, POC UA: ABNORMAL
BUN SERPL-MCNC: 12 MG/DL (ref 7–22)
CALCIUM SERPL-MCNC: 10 MG/DL (ref 8–10.3)
CHLORIDE SERPL-SCNC: 106 MMOL/L (ref 98–108)
CLARITY, UA: ABNORMAL
COLOR, UA: YELLOW
CREAT SERPL-MCNC: 0.7 MG/DL (ref 0.6–1.2)
CTP QC/QA: YES
GLUCOSE SERPL-MCNC: 107 MG/DL (ref 73–118)
GLUCOSE, POC UA: NEGATIVE
HCT, POC: NORMAL
HGB, POC: NORMAL (ref 14–18)
KETONES, POC UA: NEGATIVE
LEUKOCYTE EST, POC UA: ABNORMAL
MCH, POC: NORMAL
MCHC, POC: NORMAL
MCV, POC: NORMAL
MPV, POC: NORMAL
NITRITE, POC UA: NEGATIVE
PH UR STRIP: 7 [PH] (ref 5–8)
POC ALT (SGPT): 20 U/L (ref 10–47)
POC ALT (SGPT): <5 U/L (ref 10–47)
POC AMYLASE: 36 U/L (ref 14–97)
POC AST (SGOT): 16 U/L (ref 11–38)
POC AST (SGOT): 21 U/L (ref 11–38)
POC GGT: 56 U/L (ref 5–65)
POC PLATELET COUNT: NORMAL
POC TCO2: 29 MMOL/L (ref 18–33)
POTASSIUM BLD-SCNC: 5 MMOL/L (ref 3.6–5.1)
PROTEIN, POC UA: NEGATIVE
PROTEIN, POC: 8.3 G/DL (ref 6.4–8.1)
PROTEIN, POC: 8.3 G/DL (ref 6.4–8.1)
RBC, POC: NORMAL
RDW, POC: NORMAL
SODIUM BLD-SCNC: 137 MMOL/L (ref 128–145)
SPECIFIC GRAVITY, POC UA: 1.02 (ref 1–1.03)
UROBILINOGEN, POC UA: 0.2 E.U./DL
WBC, POC: NORMAL

## 2025-03-26 PROCEDURE — 80053 COMPREHEN METABOLIC PANEL: CPT | Mod: ER

## 2025-03-26 PROCEDURE — 85025 COMPLETE CBC W/AUTO DIFF WBC: CPT | Mod: ER

## 2025-03-26 PROCEDURE — 82040 ASSAY OF SERUM ALBUMIN: CPT | Mod: 59,ER

## 2025-03-26 PROCEDURE — 81025 URINE PREGNANCY TEST: CPT | Mod: ER

## 2025-03-26 PROCEDURE — 87088 URINE BACTERIA CULTURE: CPT | Performed by: NURSE PRACTITIONER

## 2025-03-26 PROCEDURE — 82150 ASSAY OF AMYLASE: CPT | Mod: ER

## 2025-03-26 PROCEDURE — 81025 URINE PREGNANCY TEST: CPT | Mod: ER | Performed by: STUDENT IN AN ORGANIZED HEALTH CARE EDUCATION/TRAINING PROGRAM

## 2025-03-26 PROCEDURE — 99284 EMERGENCY DEPT VISIT MOD MDM: CPT | Mod: 25,ER

## 2025-03-26 RX ORDER — CEPHALEXIN 500 MG/1
500 CAPSULE ORAL EVERY 12 HOURS
Qty: 14 CAPSULE | Refills: 0 | Status: SHIPPED | OUTPATIENT
Start: 2025-03-26 | End: 2025-04-02

## 2025-03-26 RX ORDER — NAPROXEN 500 MG/1
500 TABLET ORAL EVERY 12 HOURS
Qty: 10 TABLET | Refills: 0 | Status: SHIPPED | OUTPATIENT
Start: 2025-03-26 | End: 2025-03-31

## 2025-03-26 NOTE — ED PROVIDER NOTES
Encounter Date: 3/26/2025    SCRIBE #1 NOTE: I, Anju Su, am scribing for, and in the presence of,  Marko Zelaya FNP. I have scribed the following portions of the note - Other sections scribed: HPI,ROS,.       History     Chief Complaint   Patient presents with    Flank Pain     R side pain x 2 days. No injuries.     CC: Right flank pain     HPI: Kriss Alvarez, a 39 y.o. female presents to the ED with complaint of non-radiating right lateral side/abdominal pain on ambulation that began a few days ago. Pain only occurs with walking, does not occur at rest. Patient reports history of kidney stone and states pain feels slightly similar. Denies any attempt at treatment. Denies history of ovarian problems. Denies other abdominal pain, back pain, dysuria, vaginal discharge, vaginal pain, or other associated symptoms. Patient denies any PSHx to abdomen.     Patient Active Problem List:     Renal calculus     Primary hypertension     Bacteremia        The history is provided by the patient. No  was used.     Review of patient's allergies indicates:  No Known Allergies  Past Medical History:   Diagnosis Date    Hypertension      History reviewed. No pertinent surgical history.  No family history on file.  Social History[1]  Review of Systems   Constitutional:  Negative for fever.   HENT:  Negative for congestion, ear discharge, ear pain, rhinorrhea, sore throat and trouble swallowing.    Eyes:  Negative for visual disturbance.   Respiratory:  Negative for cough and shortness of breath.    Cardiovascular:  Negative for chest pain and leg swelling.   Gastrointestinal:  Positive for abdominal pain (Right sided, mid/lower). Negative for diarrhea, nausea and vomiting.   Genitourinary:  Negative for dysuria.   Musculoskeletal:  Negative for back pain, neck pain and neck stiffness.   Skin:  Negative for color change, rash and wound.   Neurological:  Negative for seizures, syncope, speech difficulty,  weakness and headaches.   Psychiatric/Behavioral:  Negative for confusion.        Physical Exam     Initial Vitals [03/26/25 0823]   BP Pulse Resp Temp SpO2   (!) 145/94 85 18 98.6 °F (37 °C) 100 %      MAP       --         Physical Exam    Nursing note and vitals reviewed.  Constitutional: She appears well-developed and well-nourished. She is not diaphoretic. She is cooperative.  Non-toxic appearance. She does not have a sickly appearance. She does not appear ill. No distress.   Body mass index is 54.8 kg/m².   HENT:   Head: Normocephalic and atraumatic.   Right Ear: External ear normal.   Left Ear: External ear normal.   Nose: Nose normal. Mouth/Throat: Mucous membranes are normal. No trismus in the jaw.   Eyes: Conjunctivae and EOM are normal.   Neck: Phonation normal. No tracheal deviation present.   Normal range of motion.  Cardiovascular:  Normal rate, regular rhythm and intact distal pulses.           Pulses:       Radial pulses are 2+ on the right side and 2+ on the left side.   Pulmonary/Chest: Effort normal and breath sounds normal. No accessory muscle usage or stridor. No tachypnea and no bradypnea. No respiratory distress. She has no wheezes. She has no rhonchi. She has no rales. She exhibits no tenderness.   Abdominal: She exhibits no distension. There is no abdominal tenderness.   No right CVA tenderness.  No left CVA tenderness. There is no rebound and no guarding.   Musculoskeletal:         General: Normal range of motion.      Cervical back: Normal range of motion.     Neurological: She is alert and oriented to person, place, and time. She has normal strength. No sensory deficit. Coordination and gait normal. GCS score is 15. GCS eye subscore is 4. GCS verbal subscore is 5. GCS motor subscore is 6.   Skin: Skin is warm, dry and intact. Capillary refill takes less than 2 seconds. No bruising and no rash noted. No cyanosis or erythema. Nails show no clubbing.   Psychiatric: She has a normal mood and  affect. Her behavior is normal. Judgment and thought content normal.         ED Course   Procedures  Labs Reviewed   POCT URINALYSIS W/O SCOPE - Abnormal       Result Value    Glucose, UA Negative      Bilirubin, UA Negative      Ketones, UA Negative      Spec Grav UA 1.020      Blood, UA Trace-intact (*)     PH, UA 7.0      Protein, UA Negative      Urobilinogen, UA 0.2      Nitrite, UA Negative      Leukocytes, UA Trace (*)     Color, UA POC Yellow      Clarity, UA, POC Slightly Cloudy     POCT CMP - Abnormal    Albumin, POC 4.1      Alkaline Phosphatase, POC 72      ALT (SGPT), POC 20      AST (SGOT), POC 21      POC BUN 12      Calcium, POC 10.0      POC Chloride 106      POC Creatinine 0.7      POC Glucose 107      POC Potassium 5.0      POC Sodium 137      Bilirubin, POC 0.6      POC TCO2 29      Protein, POC 8.3 (*)    CULTURE, URINE   POCT URINE PREGNANCY    POC Preg Test, Ur Negative       Acceptable Yes     POCT CBC    Hematocrit        Hemoglobin        RBC        WBC        MCV        MCH, POC        MCHC        RDW-CV        Platelet Count, POC        MPV       POCT URINALYSIS W/O SCOPE   POCT CMP   POCT LIVER PANEL     CBC:          Imaging Results              CT Abdomen Pelvis  Without Contrast (Final result)  Result time 03/26/25 09:23:16      Final result by Bakari Seth DO (03/26/25 09:23:16)                   Impression:      No acute abnormality of the abdomen or pelvis.    Nonobstructing left-sided nephrolithiasis.  No hydronephrosis.    Cholelithiasis without acute cholecystitis.      Electronically signed by: Bakari Seth  Date:    03/26/2025  Time:    09:23               Narrative:    EXAMINATION:  CT ABDOMEN PELVIS WITHOUT CONTRAST    CLINICAL HISTORY:  RLQ abdominal pain (Age >= 14y);    TECHNIQUE:  Multiplanar images were obtained of the abdomen and pelvis from the hemidiaphragms through the symphysis pubis without intravenous contrast.    COMPARISON:  CT abdomen and  pelvis from 10/25/2023.    FINDINGS:  Lung Bases: Clear.    Heart: Heart size is normal.  No pericardial effusion.    Liver: Normal in size and attenuation without focal hepatic lesion.    Biliary tract: No intrahepatic or extrahepatic biliary ductal dilatation.    Gallbladder: There is cholelithiasis without evidence of acute cholecystitis.    Pancreas: Normal. No pancreatic ductal dilatation.    Spleen: Normal size without focal lesion.    Adrenals: Normal.    Kidneys and urinary collecting systems: There is a 1.0 cm left nonobstructing renal calculus.  Additional punctate nonobstructing left renal calculi are noted.  No right-sided nephrolithiasis.  There is no hydronephrosis or obstructing ureteral stone.  No hydronephrosis or urolithiasis.    Lymph nodes: None enlarged.    Stomach and bowel: The stomach is normal.  Loops of small and large bowel are normal in caliber without evidence for inflammation or obstruction.  The appendix is normal.    Peritoneum and mesentery: No ascites or free intraperitoneal air. No abdominal fluid collection.    Vasculature: Normal.    Urinary bladder: Normal.    Reproductive organs: The uterus and adnexae are unremarkable.    Body wall: No abnormality.    Musculoskeletal: No aggressive osseous lesion.                                       Medications - No data to display  Medical Decision Making  Amount and/or Complexity of Data Reviewed  Labs: ordered. Decision-making details documented in ED Course.  Radiology: ordered. Decision-making details documented in ED Course.    Risk  Prescription drug management.         APC / Resident Notes:   This is an evaluation of a 39 y.o. female that presents to the Emergency Department for right lower abdominal pain from the axillary line to the abdomen, when ambulating.  No pain at rest. Physical Exam shows a non-toxic, afebrile, and well appearing female.  Abdomen is soft and nontender.  No guarding peritoneal signs.  No CVA tenderness.  No  tenderness to muscular lumbar back.  Breath sounds clear to auscultation.  Heart regular rhythm. Vital signs are reassuring. If available, previous records reviewed. RESULTS:  UPT negative.  Urinalysis with trace leukocytes and trace blood.  CBC without leukocytosis.  Normal platelet count..  H&H 10 and 28.7. (history of anemia with labs 2 years ago 9.6 and 31.2)  CMP with a protein 8.3, otherwise unremarkable.  CT of the abdomen pelvis with nonobstructing left-sided nephrolithiasis.  Cholelithiasis without acute cholecystitis.  Normal appendix.  Uterus and adnexa unremarkable.  No acute findings.    My overall impression is right-sided abdominal pain -?msk with UTI. I considered, but at this time, do not suspect pregnancy, ectopic pregnancy, ovarian torsion, bowel obstruction, bowel perforation, appendicitis, diverticulitis, cholecystitis, pancreatitis, nephrolithiasis, pyelonephritis.  When discussing the ED workup patient does state that she does walk up the stairs carrying heavy items, which could be a contributing factor to her right side pain.    Discharge Meds/Instructions: Keflex, Naproxen. The diagnosis, treatment plan, instructions for follow-up as well as ED return precautions were discussed. All questions have been answered. ANA LUISA Licea, HUMBERTO, GUILLERMO     Scribe Attestation:   Scribe #1: I performed the above scribed service and the documentation accurately describes the services I performed. I attest to the accuracy of the note.                             I, ANA LUISA Licea, HUMBERTO, ANGI-BC, personally performed the services described in this documentation. All medical record entries made by the scribe were at my direction and in my presence. I have reviewed the chart and agree that the record reflects my personal performance and is accurate and complete.      DISCLAIMER: This note was prepared with in[employer] voice recognition transcription software. Garbled syntax, mangled pronouns, and other  bizarre constructions may be attributed to that software system.    Clinical Impression:  Final diagnoses:  [R10.9] Right sided abdominal pain (Primary)  [N30.01] Acute cystitis with hematuria          ED Disposition Condition    Discharge Stable          ED Prescriptions       Medication Sig Dispense Start Date End Date Auth. Provider    cephALEXin (KEFLEX) 500 MG capsule Take 1 capsule (500 mg total) by mouth every 12 (twelve) hours. for 7 days 14 capsule 3/26/2025 4/2/2025 Marko Zelaya FNP    naproxen (NAPROSYN) 500 MG tablet Take 1 tablet (500 mg total) by mouth every 12 (twelve) hours. Do not take any additional NSAIDs while you are taking this medication including (Advil, Aleve, Motrin, Ibuprofen, Mobic\meloxicam, Naprosyn, Toradol, ketoralac, etc.). for 5 days 10 tablet 3/26/2025 3/31/2025 Marko Zelaya FNP          Follow-up Information    None              [1]   Social History  Tobacco Use    Smoking status: Never        Marko Zelaya FNP  03/26/25 1004

## 2025-03-26 NOTE — DISCHARGE INSTRUCTIONS
§ Please return to the Emergency Department for any new or worsening symptoms including: fever, chest pain, shortness of breath, loss of consciousness, dizziness, weakness, or any other concerns.     § Schedule an appointment for follow up with your Primary Care Doctor as soon as possible for a recheck of your symptoms. If you do not have one, contact the one listed on your discharge paperwork or call the [facility] Appointment Desk at [phone] to schedule an appointment.     § Please take all medication as prescribed. You have been prescribed Naproxen for pain. This is an Non-Steroidal Anti-Inflammatory (NSAID) Medication. Please do not take any additional NSAIDs while you are taking this medication including (Advil, Aleve, Motrin, Ibuprofen, Mobic\meloxicam, Naprosyn, Toradol, ketoralac, etc.). Please stop taking this medication if you experience: weakness, itching, yellow skin or eyes, joint pains, vomiting blood, blood or black stools, unusual weight gain, or swelling in your arms, legs, hands, or feet.

## 2025-03-27 ENCOUNTER — RESULTS FOLLOW-UP (OUTPATIENT)
Dept: EMERGENCY MEDICINE | Facility: HOSPITAL | Age: 40
End: 2025-03-27

## 2025-03-28 LAB — BACTERIA UR CULT: ABNORMAL

## 2025-04-07 ENCOUNTER — HOSPITAL ENCOUNTER (EMERGENCY)
Facility: HOSPITAL | Age: 40
Discharge: HOME | End: 2025-04-07
Attending: STUDENT IN AN ORGANIZED HEALTH CARE EDUCATION/TRAINING PROGRAM
Payer: MEDICARE

## 2025-04-07 VITALS
TEMPERATURE: 100 F | SYSTOLIC BLOOD PRESSURE: 121 MMHG | WEIGHT: 293 LBS | BODY MASS INDEX: 47.09 KG/M2 | DIASTOLIC BLOOD PRESSURE: 76 MMHG | HEART RATE: 97 BPM | OXYGEN SATURATION: 97 % | RESPIRATION RATE: 16 BRPM | HEIGHT: 66 IN

## 2025-04-07 DIAGNOSIS — R11.2 NAUSEA VOMITING AND DIARRHEA: Primary | ICD-10-CM

## 2025-04-07 DIAGNOSIS — R19.7 NAUSEA VOMITING AND DIARRHEA: Primary | ICD-10-CM

## 2025-04-07 LAB
BILIRUBIN, POC UA: NEGATIVE
BLOOD, POC UA: ABNORMAL
CLARITY, UA: CLEAR
COLOR, UA: YELLOW
CTP QC/QA: YES
GLUCOSE, POC UA: NEGATIVE
INFLUENZA A ANTIGEN, POC: NEGATIVE
INFLUENZA B ANTIGEN, POC: NEGATIVE
KETONES, POC UA: NEGATIVE
LEUKOCYTE EST, POC UA: NEGATIVE
NITRITE, POC UA: NEGATIVE
PH UR STRIP: 6 [PH] (ref 5–8)
PROTEIN, POC UA: ABNORMAL
SARS-COV-2 RDRP RESP QL NAA+PROBE: NEGATIVE
SPECIFIC GRAVITY, POC UA: >=1.03 (ref 1–1.03)
UROBILINOGEN, POC UA: 0.2 E.U./DL

## 2025-04-07 PROCEDURE — 99283 EMERGENCY DEPT VISIT LOW MDM: CPT | Mod: ER

## 2025-04-07 PROCEDURE — 87635 SARS-COV-2 COVID-19 AMP PRB: CPT | Mod: ER | Performed by: STUDENT IN AN ORGANIZED HEALTH CARE EDUCATION/TRAINING PROGRAM

## 2025-04-07 PROCEDURE — 87804 INFLUENZA ASSAY W/OPTIC: CPT | Mod: ER

## 2025-04-07 PROCEDURE — 25000003 PHARM REV CODE 250: Mod: ER | Performed by: STUDENT IN AN ORGANIZED HEALTH CARE EDUCATION/TRAINING PROGRAM

## 2025-04-07 RX ORDER — ONDANSETRON 4 MG/1
4 TABLET, ORALLY DISINTEGRATING ORAL
Status: COMPLETED | OUTPATIENT
Start: 2025-04-07 | End: 2025-04-07

## 2025-04-07 RX ORDER — ONDANSETRON 4 MG/1
4 TABLET, FILM COATED ORAL EVERY 6 HOURS
Qty: 12 TABLET | Refills: 0 | Status: SHIPPED | OUTPATIENT
Start: 2025-04-07

## 2025-04-07 RX ADMIN — ONDANSETRON 4 MG: 4 TABLET, ORALLY DISINTEGRATING ORAL at 07:04

## 2025-07-15 NOTE — ED PROVIDER NOTES
"Encounter Date: 4/7/2025       History     Chief Complaint   Patient presents with    Vomiting     N/V/D since yesterday. Denies fever. Pt recently on antibiotics for UTI, "I didn't finish them".      39-year-old female presents for evaluation of nausea vomiting and diarrhea ongoing for the past day.  She had proximally 4 episodes of nonbloody nonbilious emesis, resolving by noon.  Since that time she is able to drink water, and she had an orange.  She has decreased appetite.  She has not kept track of the number of time she has had diarrhea.  She denies dysuria hematuria urgency or frequency.        Review of patient's allergies indicates:  No Known Allergies  Past Medical History:   Diagnosis Date    Hypertension      History reviewed. No pertinent surgical history.  No family history on file.  Social History[1]  Review of Systems   Gastrointestinal:  Positive for diarrhea, nausea and vomiting.       Physical Exam     Initial Vitals [04/07/25 1923]   BP Pulse Resp Temp SpO2   (!) 128/91 (!) 112 18 99.6 °F (37.6 °C) 97 %      MAP       --         Physical Exam    Nursing note and vitals reviewed.  Constitutional: She appears well-developed and well-nourished. She is not diaphoretic.   HENT:   Head: Normocephalic and atraumatic. Mouth/Throat: Oropharynx is clear and moist.   Eyes: EOM are normal. Pupils are equal, round, and reactive to light. Right eye exhibits no discharge. Left eye exhibits no discharge.   Neck: No tracheal deviation present.   Normal range of motion.  Cardiovascular:  Normal rate, regular rhythm and intact distal pulses.           Pulmonary/Chest: No respiratory distress. She has no wheezes. She exhibits no tenderness.   Abdominal: Abdomen is soft. She exhibits no distension. There is no abdominal tenderness.   Musculoskeletal:         General: No tenderness or edema. Normal range of motion.      Cervical back: Normal range of motion.     Neurological: She is alert and oriented to person, place, " Plan: His right leg is improved from his last visit, but still has a ways to go and things he should be doing to further optimize improvement. We will move forward with Mohs surgery tomorrow. I have once again recommended thigh high compression, not knee high. He will bring both types of compression stockings with him tomorrow. He will bring his crutches as well as his walker and his wife will bring the cleanser she got from the pharmacy that is being used. and time. She has normal strength. No cranial nerve deficit or sensory deficit. GCS eye subscore is 4. GCS verbal subscore is 5. GCS motor subscore is 6.   Skin: Skin is warm and dry. No rash noted.   Psychiatric: She has a normal mood and affect. Her behavior is normal. Thought content normal.         ED Course   Procedures  Labs Reviewed   POCT URINALYSIS W/O SCOPE - Abnormal       Result Value    Glucose, UA Negative      Bilirubin, UA Negative      Ketones, UA Negative      Spec Grav UA >=1.030 (*)     Blood, UA 1+ (*)     PH, UA 6.0      Protein, UA 1+ (*)     Urobilinogen, UA 0.2      Nitrite, UA Negative      Leukocytes, UA Negative      Color, UA POC Yellow      Clarity, UA, POC Clear     SARS-COV-2 RDRP GENE    POC Rapid COVID Negative       Acceptable Yes      Narrative:     This test utilizes isothermal nucleic acid amplification technology to detect the SARS-CoV-2 RdRp nucleic acid segment. The analytical sensitivity (limit of detection) is 500 copies/swab.     A POSITIVE result is indicative of the presence of SARS-CoV-2 RNA; clinical correlation with patient history and other diagnostic information is necessary to determine patient infection status.    A NEGATIVE result means that SARS-CoV-2 nucleic acids are not present above the limit of detection. A NEGATIVE result should be treated as presumptive. It does not rule out the possibility of COVID-19 and should not be the sole basis for treatment decisions. If COVID-19 is strongly suspected based on clinical and exposure history, re-testing using an alternate molecular assay should be considered.     Commercial kits are provided by [employer].        POCT INFLUENZA A/B MOLECULAR   POCT RAPID INFLUENZA A/B    Influenza B Ag negative      Inflenza A Ag negative            Imaging Results    None          Medications   ondansetron disintegrating tablet 4 mg (4 mg Oral Given 4/7/25 1954)     Medical Decision Making  Well-appearing  39-year-old female presents for evaluation of nausea and vomiting along with diarrhea ongoing since this morning.  Last episodes where around noon.  She is now tolerating p.o. intake.  Vitals normal.  Abdomen is soft and nontender, doubt acute intra-abdominal process.  She drank bottle water while waiting, without any issues.  Vitals here within normal limits.  She is flu and COVID negative.  I did not think that has a life-threatening cause of her symptoms today, will discharge with Zofran for symptomatic control, outpatient follow-up and return precautions.  She is comfortable with the plan.    Amount and/or Complexity of Data Reviewed  Labs: ordered.    Risk  Prescription drug management.                                      Clinical Impression:  Final diagnoses:  [R11.2, R19.7] Nausea vomiting and diarrhea (Primary)          ED Disposition Condition    Discharge Stable          ED Prescriptions       Medication Sig Dispense Start Date End Date Auth. Provider    ondansetron (ZOFRAN) 4 MG tablet Take 1 tablet (4 mg total) by mouth every 6 (six) hours. 12 tablet 4/7/2025 -- Shane Grant MD          Follow-up Information       Follow up With Specialties Details Why Contact [facility] -  Go in 1 day To set up a follow-up appointment, To recheck today's symptoms [address]  [place] [address]  [phone]                   [1]   Social History  Tobacco Use    Smoking status: Never        Shane Grant MD  04/07/25 9800     Detail Level: Zone